# Patient Record
Sex: MALE | Race: WHITE | Employment: OTHER | URBAN - METROPOLITAN AREA
[De-identification: names, ages, dates, MRNs, and addresses within clinical notes are randomized per-mention and may not be internally consistent; named-entity substitution may affect disease eponyms.]

---

## 2017-05-01 PROCEDURE — 84153 ASSAY OF PSA TOTAL: CPT | Performed by: INTERNAL MEDICINE

## 2017-05-17 PROBLEM — Z79.899 ENCOUNTER FOR LONG-TERM (CURRENT) DRUG USE: Status: ACTIVE | Noted: 2017-05-17

## 2017-05-17 PROBLEM — Z12.11 COLON CANCER SCREENING: Status: ACTIVE | Noted: 2017-05-17

## 2017-12-12 PROBLEM — M79.642 LEFT HAND PAIN: Status: ACTIVE | Noted: 2017-12-12

## 2017-12-12 PROBLEM — M79.641 RIGHT HAND PAIN: Status: ACTIVE | Noted: 2017-12-12

## 2018-02-08 PROBLEM — M79.641 RIGHT HAND PAIN: Status: RESOLVED | Noted: 2017-12-12 | Resolved: 2018-02-08

## 2018-02-08 PROBLEM — M79.642 LEFT HAND PAIN: Status: RESOLVED | Noted: 2017-12-12 | Resolved: 2018-02-08

## 2018-05-16 PROCEDURE — 84153 ASSAY OF PSA TOTAL: CPT | Performed by: INTERNAL MEDICINE

## 2018-05-30 PROBLEM — I77.9 CAROTID DISEASE, BILATERAL: Status: ACTIVE | Noted: 2018-05-30

## 2018-05-30 PROBLEM — I77.9 CAROTID DISEASE, BILATERAL (HCC): Status: ACTIVE | Noted: 2018-05-30

## 2018-11-15 PROCEDURE — 84153 ASSAY OF PSA TOTAL: CPT | Performed by: INTERNAL MEDICINE

## 2020-06-29 PROBLEM — Z12.11 COLON CANCER SCREENING: Status: RESOLVED | Noted: 2017-05-17 | Resolved: 2020-06-29

## 2020-06-29 PROBLEM — Z79.899 ENCOUNTER FOR LONG-TERM (CURRENT) DRUG USE: Status: RESOLVED | Noted: 2017-05-17 | Resolved: 2020-06-29

## 2021-01-11 ENCOUNTER — LAB ENCOUNTER (OUTPATIENT)
Dept: LAB | Facility: HOSPITAL | Age: 81
End: 2021-01-11
Attending: INTERNAL MEDICINE
Payer: MEDICARE

## 2021-01-11 DIAGNOSIS — R14.2 BELCHING: ICD-10-CM

## 2021-01-12 LAB — SARS-COV-2 RNA RESP QL NAA+PROBE: NOT DETECTED

## 2021-01-13 ENCOUNTER — ANESTHESIA EVENT (OUTPATIENT)
Dept: ENDOSCOPY | Facility: HOSPITAL | Age: 81
End: 2021-01-13
Payer: MEDICARE

## 2021-01-14 ENCOUNTER — ANESTHESIA (OUTPATIENT)
Dept: ENDOSCOPY | Facility: HOSPITAL | Age: 81
End: 2021-01-14
Payer: MEDICARE

## 2021-01-14 ENCOUNTER — HOSPITAL ENCOUNTER (OUTPATIENT)
Facility: HOSPITAL | Age: 81
Setting detail: HOSPITAL OUTPATIENT SURGERY
Discharge: HOME | End: 2021-01-14
Attending: INTERNAL MEDICINE | Admitting: INTERNAL MEDICINE
Payer: MEDICARE

## 2021-01-14 VITALS
OXYGEN SATURATION: 100 % | BODY MASS INDEX: 28.7 KG/M2 | DIASTOLIC BLOOD PRESSURE: 54 MMHG | RESPIRATION RATE: 20 BRPM | WEIGHT: 205 LBS | HEART RATE: 70 BPM | HEIGHT: 71 IN | TEMPERATURE: 97 F | SYSTOLIC BLOOD PRESSURE: 92 MMHG

## 2021-01-14 DIAGNOSIS — R14.2 BELCHING: Primary | ICD-10-CM

## 2021-01-14 DIAGNOSIS — R10.12 LEFT UPPER QUADRANT ABDOMINAL PAIN: ICD-10-CM

## 2021-01-14 PROCEDURE — 0DB68ZX EXCISION OF STOMACH, VIA NATURAL OR ARTIFICIAL OPENING ENDOSCOPIC, DIAGNOSTIC: ICD-10-PCS | Performed by: INTERNAL MEDICINE

## 2021-01-14 PROCEDURE — 0DB98ZX EXCISION OF DUODENUM, VIA NATURAL OR ARTIFICIAL OPENING ENDOSCOPIC, DIAGNOSTIC: ICD-10-PCS | Performed by: INTERNAL MEDICINE

## 2021-01-14 PROCEDURE — 0DBP8ZX EXCISION OF RECTUM, VIA NATURAL OR ARTIFICIAL OPENING ENDOSCOPIC, DIAGNOSTIC: ICD-10-PCS | Performed by: INTERNAL MEDICINE

## 2021-01-14 PROCEDURE — 88305 TISSUE EXAM BY PATHOLOGIST: CPT | Performed by: INTERNAL MEDICINE

## 2021-01-14 PROCEDURE — 0DBM8ZX EXCISION OF DESCENDING COLON, VIA NATURAL OR ARTIFICIAL OPENING ENDOSCOPIC, DIAGNOSTIC: ICD-10-PCS | Performed by: INTERNAL MEDICINE

## 2021-01-14 RX ORDER — SODIUM CHLORIDE, SODIUM LACTATE, POTASSIUM CHLORIDE, CALCIUM CHLORIDE 600; 310; 30; 20 MG/100ML; MG/100ML; MG/100ML; MG/100ML
INJECTION, SOLUTION INTRAVENOUS CONTINUOUS
Status: DISCONTINUED | OUTPATIENT
Start: 2021-01-14 | End: 2021-01-14

## 2021-01-14 NOTE — DISCHARGE SUMMARY
[facility] ENDOSCOPY  Patients Name: Aquilino Acuña  Attending Physician: Boston Barr MD  Operating Physician: Wilberto Warren MD  CSN: [id number]     Location:  OR  MRN: [id number]    YOB: 1940  Admission Date: 1/14/2021  Operation Date: 1/14/2021

## 2021-01-14 NOTE — ANESTHESIA PREPROCEDURE EVALUATION
PRE-OP EVALUATION    Patient Name: Leida Sauceda    Pre-op Diagnosis: Left upper quadrant abdominal pain [R10.12]  Belching [R14.2]    Procedure(s):  COLONOSCOPY AND  ESOPHAGOGASTRODUODENOSCOPY      Surgeon(s) and Role:     * Luca Churchill MD - Primary ROS.                       Neuro/Psych    Negative neuro/psych ROS.                                 Past Surgical History:   Procedure Laterality Date   • EXCISION OF SEBACEOUS CYST N/A 10/16/2014    Performed by Joya Blakely MD at [address] [place] [address] general anesthesia if unable to tolerate procedure in MAC    Plan/risks discussed with: patient                Present on Admission:  **None**

## 2021-01-14 NOTE — ANESTHESIA POSTPROCEDURE EVALUATION
[address] Patient Status:  Hospital Outpatient Surgery   Age/Gender 78year old male MRN [id number]   [facility] ENDOSCOPY Attending Gerry Ansari MD   Hosp Day # 0 PCP Temitope Magana MD       Anesthesia Post-op Note

## 2021-01-14 NOTE — OPERATIVE REPORT
[facility] OPERATIVE REPORT   PATIENT NAME: Lian Akins  MRN: [id number]  DATE OF OPERATION: 1/14/2021  PREOPERATIVE DIAGNOSIS:   1. Black stools  2. L sided pain  3. Change in bowel habits   POSTOPERATIVE DIAGNOSES:  1.  Normal d which revealed no obvious perianal disease or palpable masses within the anal canal. The lubricated tip of an Olympus video colonoscope was introduced into the anus and advanced through the colon to the cecum.  The cecum was identified by the ileocecal valv

## 2021-01-14 NOTE — H&P
History & Physical Examination    Name: Teresa Castle  Patient ID:  [id number]  Date:  1/14/2021  YOB: 1940 AGE:  78year old SEX:  male      M.D./D.O./D.P.M PERFORMING SURGERY/PROCEDURE:    Farhana Estrada MD    Diagnosis:  (R14.2) Radha trunk,arm,leg (N/A, 10/16/2014); patient withough preoperative order for iv antibiotic surgical site infection prophylaxis.  (N/A, 10/16/2014); patient documented not to have experienced any of the following events (N/A, 10/16/2014); total knee replacement [facility]   • HIP REPLACEMENT SURGERY         right hip -2004 - dr Rodrick Ko   • OTHER SURGICAL HISTORY         excision of michelle scys on back- Dr. Jp Modi   • SPECIAL SERVICE OR REPORT   Aug 2006     Brachytherapy of the prostate   • TONSILLECTOMY         anxiety  HEMATOLOGIC: denies bleeding or bruising  ENDOCRINE: denies inc in thirst or heat/cold intolerance  ALL/ASTHMA: denies hx of allergy or asthma        PHYSICAL EXAM:   /82   Ht 5' 11\" (1.803 m)   Wt 212 lb (96.2 kg)   BMI 29.57 kg/m²    GENE fL 10.5      Radiological data:  CT ABDOMEN+PELVIS(CPT=74176)  CLINICAL INDICATION: Left lower quadrant pain. COMPARISON STUDY: 6/27/2008.   TECHNIQUE:  Multiple axial images of the abdomen and pelvis were performed from the lung bases  through the pubic s seeds are demonstrated within the prostate gland. MESENTERY/RETROPERITONEUM: There is no evidence of mesenteric, retroperitoneal or inguinal  adenopathy. PERITONEUM: There is no free air or significant free fluid.   OSSEOUS STRUCTURES: There are bilateral

## 2021-02-08 NOTE — PROGRESS NOTES
2/8/2021  [facility] [address] [id number]    Dear Rito Storeyor,      Here are the biopsy/pathology findings from your recent EGD (upper endoscopy) and colonoscopy:     The biopsy/pathology findings from your upper endoscopy showed:    gastrit

## 2021-04-09 ENCOUNTER — OFFICE VISIT (OUTPATIENT)
Dept: PODIATRY CLINIC | Facility: CLINIC | Age: 81
End: 2021-04-09
Payer: MEDICARE

## 2021-04-09 DIAGNOSIS — L84 PRE-ULCERATIVE CALLUSES: Primary | ICD-10-CM

## 2021-04-09 DIAGNOSIS — M21.622 TAILOR'S BUNION OF BOTH FEET: ICD-10-CM

## 2021-04-09 DIAGNOSIS — M20.12 VALGUS DEFORMITY OF BOTH GREAT TOES: ICD-10-CM

## 2021-04-09 DIAGNOSIS — M77.41 METATARSALGIA OF BOTH FEET: ICD-10-CM

## 2021-04-09 DIAGNOSIS — M21.621 TAILOR'S BUNION OF BOTH FEET: ICD-10-CM

## 2021-04-09 DIAGNOSIS — M20.11 VALGUS DEFORMITY OF BOTH GREAT TOES: ICD-10-CM

## 2021-04-09 DIAGNOSIS — M79.672 PAIN IN BOTH FEET: ICD-10-CM

## 2021-04-09 DIAGNOSIS — M21.6X9 PLANTAR FLEXED METATARSAL, UNSPECIFIED LATERALITY: ICD-10-CM

## 2021-04-09 DIAGNOSIS — M79.671 PAIN IN BOTH FEET: ICD-10-CM

## 2021-04-09 DIAGNOSIS — M77.42 METATARSALGIA OF BOTH FEET: ICD-10-CM

## 2021-04-09 PROCEDURE — 99213 OFFICE O/P EST LOW 20 MIN: CPT | Performed by: PODIATRIST

## 2021-04-09 NOTE — PROGRESS NOTES
Enio Ortiz is a 78year old male. Patient presents with:  New Patient: painful calluses on the bottom of bilateral feet - pain scale 8/10.       HPI:     This 78-year-old male patient who is a previous patient of mine from our previous practice presents to 10/16/2014    Procedure: EXCISION OF SEBACEOUS CYST;  Surgeon: Yesenia Moyer MD;  Location: [facility]   • HIP REPLACEMENT SURGERY      right hip -2004 - dr Dirk Leos   • OTHER SURGICAL HISTORY      excision of seb scys on back- Dr. Bhakti Junior   • distress  EXTREMITIES:   1. Integument: Normal skin temperature and turgor. preulcerative callus buildup is noted at the plantar aspect of his fifth MPJs bilateral and his left first MPJ with nucleation bilateral with tenderness on palpation to the lesions. and which are wide enough and deep enough in order to properly accommodate both of his feet along with his custom orthotics without causing any irritation/pressure/pain/other issues anywhere.   He is to take his custom orthotics with him to be appropriately

## 2021-05-14 ENCOUNTER — OFFICE VISIT (OUTPATIENT)
Dept: PODIATRY CLINIC | Facility: CLINIC | Age: 81
End: 2021-05-14
Payer: MEDICARE

## 2021-05-14 DIAGNOSIS — M21.621 TAILOR'S BUNION OF BOTH FEET: ICD-10-CM

## 2021-05-14 DIAGNOSIS — L84 PRE-ULCERATIVE CALLUSES: ICD-10-CM

## 2021-05-14 DIAGNOSIS — M21.6X9 PLANTAR FLEXED METATARSAL, UNSPECIFIED LATERALITY: ICD-10-CM

## 2021-05-14 DIAGNOSIS — M20.12 VALGUS DEFORMITY OF BOTH GREAT TOES: ICD-10-CM

## 2021-05-14 DIAGNOSIS — M77.41 METATARSALGIA OF BOTH FEET: Primary | ICD-10-CM

## 2021-05-14 DIAGNOSIS — M79.671 PAIN IN BOTH FEET: ICD-10-CM

## 2021-05-14 DIAGNOSIS — M79.672 PAIN IN BOTH FEET: ICD-10-CM

## 2021-05-14 DIAGNOSIS — M20.11 VALGUS DEFORMITY OF BOTH GREAT TOES: ICD-10-CM

## 2021-05-14 DIAGNOSIS — M77.42 METATARSALGIA OF BOTH FEET: Primary | ICD-10-CM

## 2021-05-14 DIAGNOSIS — M21.622 TAILOR'S BUNION OF BOTH FEET: ICD-10-CM

## 2021-05-14 PROCEDURE — 99213 OFFICE O/P EST LOW 20 MIN: CPT | Performed by: PODIATRIST

## 2021-05-16 NOTE — PROGRESS NOTES
Angela Ferrer is a 78year old male. Patient presents with: Follow-up: painful calluses – better    HPI:     This 43-year-old male patient returns to clinic today for follow up of preulcerative hyperkeratotic lesions on the bottom of both of his feet.   He r Location: [facility]   • HIP REPLACEMENT SURGERY      right hip -2004 - dr Dagoberto Bird   • OTHER SURGICAL HISTORY      excision of michelle scys on back- Dr. Fu Age   • PATIENT DOCUMENTED NOT TO HAVE EXPERIENCED ANY OF THE FOLLOWING EVENTS N/A 10/16/201 hyperkeratotic buildup is noted at the plantar aspect of his fifth MPJs bilateral and mildly on his left first MPJ with nucleation on his fifth MPJs bilateral which are all improved with decreased callus buildup and with less tenderness on palpation to the wide enough and deep enough in order to appropriately accommodate both of his feet along with his custom orthotics without causing any irritation/pressure/pain/other issues anywhere on his feet.   He will monitor both of his feet and will inform the office

## 2021-07-12 PROBLEM — M54.50 LEFT-SIDED LOW BACK PAIN WITHOUT SCIATICA, UNSPECIFIED CHRONICITY: Status: ACTIVE | Noted: 2021-07-12

## 2021-07-14 ENCOUNTER — OFFICE VISIT (OUTPATIENT)
Dept: PODIATRY CLINIC | Facility: CLINIC | Age: 81
End: 2021-07-14
Payer: MEDICARE

## 2021-07-14 DIAGNOSIS — M77.41 METATARSALGIA OF BOTH FEET: ICD-10-CM

## 2021-07-14 DIAGNOSIS — B35.1 ONYCHOMYCOSIS: ICD-10-CM

## 2021-07-14 DIAGNOSIS — M21.622 TAILOR'S BUNION OF BOTH FEET: ICD-10-CM

## 2021-07-14 DIAGNOSIS — M21.621 TAILOR'S BUNION OF BOTH FEET: ICD-10-CM

## 2021-07-14 DIAGNOSIS — M79.674 TOE PAIN, BILATERAL: ICD-10-CM

## 2021-07-14 DIAGNOSIS — M20.12 VALGUS DEFORMITY OF BOTH GREAT TOES: Primary | ICD-10-CM

## 2021-07-14 DIAGNOSIS — M20.11 VALGUS DEFORMITY OF BOTH GREAT TOES: Primary | ICD-10-CM

## 2021-07-14 DIAGNOSIS — L60.0 ONYCHOCRYPTOSIS: ICD-10-CM

## 2021-07-14 DIAGNOSIS — M77.42 METATARSALGIA OF BOTH FEET: ICD-10-CM

## 2021-07-14 DIAGNOSIS — M79.675 TOE PAIN, BILATERAL: ICD-10-CM

## 2021-07-14 DIAGNOSIS — M21.6X9 PLANTAR FLEXED METATARSAL, UNSPECIFIED LATERALITY: ICD-10-CM

## 2021-07-14 DIAGNOSIS — L84 PRE-ULCERATIVE CALLUSES: ICD-10-CM

## 2021-07-14 PROCEDURE — 99213 OFFICE O/P EST LOW 20 MIN: CPT | Performed by: PODIATRIST

## 2021-07-31 NOTE — PROGRESS NOTES
Leida Sauceda is a 78year old male. Patient presents with:   Follow-up: Painful calluses, better - Long nails unable to trim on his own    HPI:     This 78-year-old male patient returns to clinic today for follow up of preulcerative hyperkeratotic lesions on Other and unspecified personal history of malignant neoplasm     Prostate cancer. [place] 3,3.   Pretreatment psa 4.4   • Reflux    • S/P [ ~ ' 73 ] vasectomy  1/16/2013      Past Surgical History:   Procedure Laterality Date   • COLONOSCOPY N/A 1/14/2021 otherwise  SKIN: denies any unusual skin lesions or rashes  RESPIRATORY: denies shortness of breath with exertion  CARDIOVASCULAR: denies chest pain on exertion  GI: denies abdominal pain and denies heartburn  NEURO: denies headaches    EXAM:   There were bilateral    Onychocryptosis    Onychomycosis        Plan: Discussed the clinical findings with the patient along with the treatment options.   Debrided all of his preulcerative callus buildup bilateral in the appropriate fashion without incident bilateral

## 2021-08-18 ENCOUNTER — HOSPITAL ENCOUNTER (OUTPATIENT)
Facility: HOSPITAL | Age: 81
Setting detail: OBSERVATION
Discharge: HOME | End: 2021-08-19
Attending: EMERGENCY MEDICINE | Admitting: INTERNAL MEDICINE
Payer: MEDICARE

## 2021-08-18 ENCOUNTER — APPOINTMENT (OUTPATIENT)
Dept: CT IMAGING | Facility: HOSPITAL | Age: 81
End: 2021-08-18
Attending: EMERGENCY MEDICINE
Payer: MEDICARE

## 2021-08-18 DIAGNOSIS — R55 SYNCOPE AND COLLAPSE: Primary | ICD-10-CM

## 2021-08-18 LAB
ALBUMIN SERPL-MCNC: 3.7 G/DL (ref 3.4–5)
ALBUMIN/GLOB SERPL: 1.1 {RATIO} (ref 1–2)
ALP LIVER SERPL-CCNC: 68 U/L
ALT SERPL-CCNC: 24 U/L
ANION GAP SERPL CALC-SCNC: 2 MMOL/L (ref 0–18)
AST SERPL-CCNC: 19 U/L (ref 15–37)
BASOPHILS # BLD AUTO: 0.05 X10(3) UL (ref 0–0.2)
BASOPHILS NFR BLD AUTO: 0.6 %
BILIRUB SERPL-MCNC: 1 MG/DL (ref 0.1–2)
BILIRUB UR QL STRIP.AUTO: NEGATIVE
BUN BLD-MCNC: 13 MG/DL (ref 7–18)
CALCIUM BLD-MCNC: 8.7 MG/DL (ref 8.5–10.1)
CHLORIDE SERPL-SCNC: 105 MMOL/L (ref 98–112)
CLARITY UR REFRACT.AUTO: CLEAR
CO2 SERPL-SCNC: 30 MMOL/L (ref 21–32)
COLOR UR AUTO: YELLOW
CREAT BLD-MCNC: 0.96 MG/DL
EOSINOPHIL # BLD AUTO: 0.11 X10(3) UL (ref 0–0.7)
EOSINOPHIL NFR BLD AUTO: 1.3 %
ERYTHROCYTE [DISTWIDTH] IN BLOOD BY AUTOMATED COUNT: 12.6 %
GLOBULIN PLAS-MCNC: 3.4 G/DL (ref 2.8–4.4)
GLUCOSE BLD-MCNC: 111 MG/DL (ref 70–99)
GLUCOSE UR STRIP.AUTO-MCNC: NEGATIVE MG/DL
HAV IGM SER QL: 2.1 MG/DL (ref 1.6–2.6)
HCT VFR BLD AUTO: 41.6 %
HGB BLD-MCNC: 14 G/DL
IMM GRANULOCYTES # BLD AUTO: 0.03 X10(3) UL (ref 0–1)
IMM GRANULOCYTES NFR BLD: 0.3 %
KETONES UR STRIP.AUTO-MCNC: NEGATIVE MG/DL
LEUKOCYTE ESTERASE UR QL STRIP.AUTO: NEGATIVE
LIPASE SERPL-CCNC: 153 U/L (ref 73–393)
LYMPHOCYTES # BLD AUTO: 2.29 X10(3) UL (ref 1–4)
LYMPHOCYTES NFR BLD AUTO: 26 %
M PROTEIN MFR SERPL ELPH: 7.1 G/DL (ref 6.4–8.2)
MCH RBC QN AUTO: 33.9 PG (ref 26–34)
MCHC RBC AUTO-ENTMCNC: 33.7 G/DL (ref 31–37)
MCV RBC AUTO: 100.7 FL
MONOCYTES # BLD AUTO: 0.82 X10(3) UL (ref 0.1–1)
MONOCYTES NFR BLD AUTO: 9.3 %
NEUTROPHILS # BLD AUTO: 5.5 X10 (3) UL (ref 1.5–7.7)
NEUTROPHILS # BLD AUTO: 5.5 X10(3) UL (ref 1.5–7.7)
NEUTROPHILS NFR BLD AUTO: 62.5 %
NITRITE UR QL STRIP.AUTO: NEGATIVE
OSMOLALITY SERPL CALC.SUM OF ELEC: 285 MOSM/KG (ref 275–295)
PH UR STRIP.AUTO: 6 [PH] (ref 5–8)
PLATELET # BLD AUTO: 213 10(3)UL (ref 150–450)
POTASSIUM SERPL-SCNC: 3.9 MMOL/L (ref 3.5–5.1)
PROT UR STRIP.AUTO-MCNC: NEGATIVE MG/DL
RBC # BLD AUTO: 4.13 X10(6)UL
SARS-COV-2 RNA RESP QL NAA+PROBE: NOT DETECTED
SODIUM SERPL-SCNC: 137 MMOL/L (ref 136–145)
SP GR UR STRIP.AUTO: 1.01 (ref 1–1.03)
TROPONIN I SERPL-MCNC: <0.045 NG/ML (ref ?–0.04)
UROBILINOGEN UR STRIP.AUTO-MCNC: <2 MG/DL
WBC # BLD AUTO: 8.8 X10(3) UL (ref 4–11)

## 2021-08-18 PROCEDURE — 85025 COMPLETE CBC W/AUTO DIFF WBC: CPT | Performed by: EMERGENCY MEDICINE

## 2021-08-18 PROCEDURE — 99285 EMERGENCY DEPT VISIT HI MDM: CPT

## 2021-08-18 PROCEDURE — 83735 ASSAY OF MAGNESIUM: CPT | Performed by: EMERGENCY MEDICINE

## 2021-08-18 PROCEDURE — 70450 CT HEAD/BRAIN W/O DYE: CPT | Performed by: EMERGENCY MEDICINE

## 2021-08-18 PROCEDURE — 84484 ASSAY OF TROPONIN QUANT: CPT | Performed by: EMERGENCY MEDICINE

## 2021-08-18 PROCEDURE — 93005 ELECTROCARDIOGRAM TRACING: CPT

## 2021-08-18 PROCEDURE — 83690 ASSAY OF LIPASE: CPT | Performed by: EMERGENCY MEDICINE

## 2021-08-18 PROCEDURE — 93010 ELECTROCARDIOGRAM REPORT: CPT

## 2021-08-18 PROCEDURE — 81001 URINALYSIS AUTO W/SCOPE: CPT | Performed by: EMERGENCY MEDICINE

## 2021-08-18 PROCEDURE — 36415 COLL VENOUS BLD VENIPUNCTURE: CPT

## 2021-08-18 PROCEDURE — 80053 COMPREHEN METABOLIC PANEL: CPT | Performed by: EMERGENCY MEDICINE

## 2021-08-18 RX ORDER — ERYTHROMYCIN 5 MG/G
OINTMENT OPHTHALMIC
COMMUNITY
Start: 2021-08-05 | End: 2021-10-05 | Stop reason: ALTCHOICE

## 2021-08-18 RX ORDER — MOXIFLOXACIN 5 MG/ML
SOLUTION/ DROPS OPHTHALMIC
COMMUNITY
Start: 2021-08-15 | End: 2021-10-05 | Stop reason: ALTCHOICE

## 2021-08-18 NOTE — ED INITIAL ASSESSMENT (HPI)
Patient with c/o left flank pain along with belching and emesis x3 last night. Patient states by morning pain had resolved, went to the bank and his legs gave out and he fell, no LOC. Patient states he did hit his head, not on blood thinners.

## 2021-08-19 ENCOUNTER — APPOINTMENT (OUTPATIENT)
Dept: CV DIAGNOSTICS | Facility: HOSPITAL | Age: 81
End: 2021-08-19
Attending: INTERNAL MEDICINE
Payer: MEDICARE

## 2021-08-19 VITALS
SYSTOLIC BLOOD PRESSURE: 163 MMHG | BODY MASS INDEX: 27.75 KG/M2 | OXYGEN SATURATION: 96 % | WEIGHT: 198.19 LBS | HEIGHT: 71 IN | TEMPERATURE: 98 F | RESPIRATION RATE: 18 BRPM | DIASTOLIC BLOOD PRESSURE: 78 MMHG | HEART RATE: 76 BPM

## 2021-08-19 PROBLEM — R55 SYNCOPE AND COLLAPSE: Status: ACTIVE | Noted: 2021-08-19

## 2021-08-19 LAB
ANION GAP SERPL CALC-SCNC: 6 MMOL/L (ref 0–18)
ATRIAL RATE: 73 BPM
BASOPHILS # BLD AUTO: 0.05 X10(3) UL (ref 0–0.2)
BASOPHILS NFR BLD AUTO: 0.5 %
BUN BLD-MCNC: 13 MG/DL (ref 7–18)
CALCIUM BLD-MCNC: 8.9 MG/DL (ref 8.5–10.1)
CHLORIDE SERPL-SCNC: 105 MMOL/L (ref 98–112)
CO2 SERPL-SCNC: 30 MMOL/L (ref 21–32)
CREAT BLD-MCNC: 0.86 MG/DL
EOSINOPHIL # BLD AUTO: 0.14 X10(3) UL (ref 0–0.7)
EOSINOPHIL NFR BLD AUTO: 1.5 %
ERYTHROCYTE [DISTWIDTH] IN BLOOD BY AUTOMATED COUNT: 12.6 %
GLUCOSE BLD-MCNC: 100 MG/DL (ref 70–99)
HCT VFR BLD AUTO: 43.5 %
HGB BLD-MCNC: 14.7 G/DL
IMM GRANULOCYTES # BLD AUTO: 0.03 X10(3) UL (ref 0–1)
IMM GRANULOCYTES NFR BLD: 0.3 %
LYMPHOCYTES # BLD AUTO: 2.33 X10(3) UL (ref 1–4)
LYMPHOCYTES NFR BLD AUTO: 25.2 %
MCH RBC QN AUTO: 33.9 PG (ref 26–34)
MCHC RBC AUTO-ENTMCNC: 33.8 G/DL (ref 31–37)
MCV RBC AUTO: 100.2 FL
MONOCYTES # BLD AUTO: 0.83 X10(3) UL (ref 0.1–1)
MONOCYTES NFR BLD AUTO: 9 %
NEUTROPHILS # BLD AUTO: 5.86 X10 (3) UL (ref 1.5–7.7)
NEUTROPHILS # BLD AUTO: 5.86 X10(3) UL (ref 1.5–7.7)
NEUTROPHILS NFR BLD AUTO: 63.5 %
OSMOLALITY SERPL CALC.SUM OF ELEC: 292 MOSM/KG (ref 275–295)
P AXIS: 92 DEGREES
P-R INTERVAL: 148 MS
PLATELET # BLD AUTO: 224 10(3)UL (ref 150–450)
POTASSIUM SERPL-SCNC: 3.5 MMOL/L (ref 3.5–5.1)
POTASSIUM SERPL-SCNC: 4.1 MMOL/L (ref 3.5–5.1)
Q-T INTERVAL: 398 MS
QRS DURATION: 78 MS
QTC CALCULATION (BEZET): 438 MS
R AXIS: 31 DEGREES
RBC # BLD AUTO: 4.34 X10(6)UL
SODIUM SERPL-SCNC: 141 MMOL/L (ref 136–145)
T AXIS: 63 DEGREES
TROPONIN I SERPL-MCNC: <0.045 NG/ML (ref ?–0.04)
VENTRICULAR RATE: 73 BPM
WBC # BLD AUTO: 9.2 X10(3) UL (ref 4–11)

## 2021-08-19 PROCEDURE — 84484 ASSAY OF TROPONIN QUANT: CPT | Performed by: INTERNAL MEDICINE

## 2021-08-19 PROCEDURE — 84132 ASSAY OF SERUM POTASSIUM: CPT | Performed by: HOSPITALIST

## 2021-08-19 PROCEDURE — 80048 BASIC METABOLIC PNL TOTAL CA: CPT | Performed by: INTERNAL MEDICINE

## 2021-08-19 PROCEDURE — 97161 PT EVAL LOW COMPLEX 20 MIN: CPT

## 2021-08-19 PROCEDURE — 96374 THER/PROPH/DIAG INJ IV PUSH: CPT

## 2021-08-19 PROCEDURE — 96372 THER/PROPH/DIAG INJ SC/IM: CPT

## 2021-08-19 PROCEDURE — 93306 TTE W/DOPPLER COMPLETE: CPT | Performed by: INTERNAL MEDICINE

## 2021-08-19 PROCEDURE — 97116 GAIT TRAINING THERAPY: CPT

## 2021-08-19 PROCEDURE — 85025 COMPLETE CBC W/AUTO DIFF WBC: CPT | Performed by: INTERNAL MEDICINE

## 2021-08-19 RX ORDER — PANTOPRAZOLE SODIUM 40 MG/1
40 TABLET, DELAYED RELEASE ORAL
Status: DISCONTINUED | OUTPATIENT
Start: 2021-08-19 | End: 2021-08-19

## 2021-08-19 RX ORDER — BISACODYL 10 MG
10 SUPPOSITORY, RECTAL RECTAL
Status: DISCONTINUED | OUTPATIENT
Start: 2021-08-19 | End: 2021-08-19

## 2021-08-19 RX ORDER — ATORVASTATIN CALCIUM 80 MG/1
80 TABLET, FILM COATED ORAL EVERY EVENING
Status: DISCONTINUED | OUTPATIENT
Start: 2021-08-19 | End: 2021-08-19

## 2021-08-19 RX ORDER — POTASSIUM CHLORIDE 20 MEQ/1
40 TABLET, EXTENDED RELEASE ORAL EVERY 4 HOURS
Status: COMPLETED | OUTPATIENT
Start: 2021-08-19 | End: 2021-08-19

## 2021-08-19 RX ORDER — ACETAMINOPHEN 325 MG/1
650 TABLET ORAL EVERY 6 HOURS PRN
Status: DISCONTINUED | OUTPATIENT
Start: 2021-08-19 | End: 2021-08-19

## 2021-08-19 RX ORDER — METOCLOPRAMIDE HYDROCHLORIDE 5 MG/ML
10 INJECTION INTRAMUSCULAR; INTRAVENOUS EVERY 8 HOURS PRN
Status: DISCONTINUED | OUTPATIENT
Start: 2021-08-19 | End: 2021-08-19

## 2021-08-19 RX ORDER — POLYETHYLENE GLYCOL 3350 17 G/17G
17 POWDER, FOR SOLUTION ORAL DAILY PRN
Status: DISCONTINUED | OUTPATIENT
Start: 2021-08-19 | End: 2021-08-19

## 2021-08-19 RX ORDER — HEPARIN SODIUM 5000 [USP'U]/ML
5000 INJECTION, SOLUTION INTRAVENOUS; SUBCUTANEOUS EVERY 8 HOURS SCHEDULED
Status: DISCONTINUED | OUTPATIENT
Start: 2021-08-19 | End: 2021-08-19

## 2021-08-19 RX ORDER — ONDANSETRON 2 MG/ML
4 INJECTION INTRAMUSCULAR; INTRAVENOUS EVERY 6 HOURS PRN
Status: DISCONTINUED | OUTPATIENT
Start: 2021-08-19 | End: 2021-08-19

## 2021-08-19 RX ORDER — HYDRALAZINE HYDROCHLORIDE 20 MG/ML
5 INJECTION INTRAMUSCULAR; INTRAVENOUS EVERY 6 HOURS PRN
Status: DISCONTINUED | OUTPATIENT
Start: 2021-08-19 | End: 2021-08-19

## 2021-08-19 RX ORDER — ATORVASTATIN CALCIUM 80 MG/1
80 TABLET, FILM COATED ORAL EVERY EVENING
Status: DISCONTINUED | OUTPATIENT
Start: 2021-08-20 | End: 2021-08-19

## 2021-08-19 RX ORDER — SODIUM PHOSPHATE, DIBASIC AND SODIUM PHOSPHATE, MONOBASIC 7; 19 G/133ML; G/133ML
1 ENEMA RECTAL ONCE AS NEEDED
Status: DISCONTINUED | OUTPATIENT
Start: 2021-08-19 | End: 2021-08-19

## 2021-08-19 RX ORDER — LISINOPRIL 40 MG/1
40 TABLET ORAL DAILY
Status: DISCONTINUED | OUTPATIENT
Start: 2021-08-19 | End: 2021-08-19

## 2021-08-19 RX ORDER — LISINOPRIL 40 MG/1
40 TABLET ORAL DAILY
Status: DISCONTINUED | OUTPATIENT
Start: 2021-08-20 | End: 2021-08-19

## 2021-08-19 NOTE — PROGRESS NOTES
Echo results paged to cardiology. Cardiology will s/o  Discharge orders received. NURSING DISCHARGE NOTE    Discharged Home via Ambulatory. Accompanied by Family member  Belongings Taken by patient/family. IV and tele removed.     Patient and

## 2021-08-19 NOTE — PLAN OF CARE
A/O x 4, vitals stable, on room air, no SOB, NSR on tele, denies having pain.    K replaced per cardiac electrolyte protocol  PT eval pending  Cardiology consulted, ECHO is pending  Patient denies syncopal episodes, no n/v, ambulates with stand by assistanc behaviors that affect risk of falls.   - [place] fall precautions as indicated by assessment.  - Educate pt/family on patient safety including physical limitations  - Instruct pt to call for assistance with activity based on assessment  - Modify environme

## 2021-08-19 NOTE — ED PROVIDER NOTES
Patient Seen in: [facility] Emergency Department      History   Patient presents with:  Abdomen/Flank Pain  Fall    Stated Complaint: fall today, hit back of head, denies LOC.  states last night having abd/flank pa*    HPI/Subjective:   HPI    Patien Date   • COLONOSCOPY N/A 1/14/2021    Procedure: COLONOSCOPY;  Surgeon: Denilson Short MD;  Location: [address] [place] [address] ENDOSCOPY   • [address] 1.1-2CM TRUNK,ARM,LEG N/A 10/16/2014    Procedure: EXCISION OF SEBACEOUS CYST;  Surgeon: Shabnam Bethea MD;  Location: distress. Appearance: He is well-developed. He is not toxic-appearing. HENT:      Head: Normocephalic and atraumatic. Eyes:      General: No scleral icterus.      Conjunctiva/sclera: Conjunctivae normal.   Cardiovascular:      Rate and Rhythm: Tommy Ramos Abnormal            Final result                 Please view results for these tests on the individual orders. EKG    Rate, intervals and axes as noted on EKG Report.   Rate: 73  Rhythm: Sinus Rhythm  Reading: normal ekg                     CT B Patient had an IV established and labs were drawn.    -Labs were ordered and reviewed by myself. -Imaging studies were ordered and reviewed by myself.   -If available, previous medical record was reviewed for the patient to obtain additional history.  -Елена Dupree

## 2021-08-19 NOTE — H&P
[facility] Hospitalist H&P       CC: syncope    PCP: Deloris Velazquez MD    History of Present Illness: Pt is an 77 yo with mmp including but not limited to HTN/HL, GERD, is admitted for syncope. Says day prior, ate at restaurant, had 3 episodes n/v. No blood. TO HAVE EXPERIENCED ANY OF THE FOLLOWING EVENTS N/A 10/16/2014    Procedure: EXCISION OF SEBACEOUS CYST;  Surgeon: Luigi Stevens MD;  Location: [facility]   • PATIENT [facility] IV ANTIBIOTIC SURGICAL SITE INFECTION PROPHYLAXIS. Ht 5' 11\" (1.803 m)   Wt 198 lb 3.2 oz (89.9 kg)   SpO2 96%   BMI 27.64 kg/m²   General:  Alert, NAD, appears stated age   Head:  Normocephalic, without obvious abnormality, atraumatic. Eyes:  Sclera anicteric,  EOMs intact. Lids wnl.  PE   Ears, nose, vasovagal. BS upon admit unremarkable, Creatinine ok. UA neg.  Rapid covid neg  -rule out arrythmia, doubt ACS  -cards consult, appreciate  -tele    **incidental finding of blood in urine- f/u with PCP for repeat    **HTN/HL-home meds  **GERD- PPI    **PPx-

## 2021-08-19 NOTE — PHYSICAL THERAPY NOTE
PHYSICAL THERAPY QUICK EVALUATION - INPATIENT    Room Number: 3160/0069-G  Evaluation Date: 8/19/2021  Presenting Problem: syncope  Physician Order: PT Eval and Treat      Problem List  Principal Problem:    Syncope and collapse    Admitted from home aft Location: [facility]   • SPECIAL SERVICE OR REPORT  Aug 2006    Brachytherapy of the prostate   • TONSILLECTOMY     • TOTAL HIP REPLACEMENT Bilateral    • TOTAL KNEE REPLACEMENT Left        HOME SITUATION  Type of Home: House   Home Layout: Two level close to 1.5 minutes to return mid 90s with rest      Patient End of Session: In bed;Needs met;Call light within reach;RN aware of session/findings; All patient questions and concerns addressed; Family present    ASSESSMENT   Patient is a 78year old male ad

## 2021-08-19 NOTE — PROGRESS NOTES
NURSING ADMISSION NOTE      Patient admitted via Cart  Oriented to room. Safety precautions initiated. Bed in low position. Call light in reach.       Pt A&OX4 Room Air  Resting in bed  Meds given per Mar  Denies pain,N/V, SOB, Dizziness  Voids SB  Tro

## 2021-08-19 NOTE — CONSULTS
[facility] Cardiology  Consultation Note      Maryan Mitchell Patient Status:  Observation    1940 MRN [id number]   [facility] 3NE-A Attending Kaylan Davis, *   Hosp Day # 0 PCP Monica Robbins MD     Reason for consult: Q4H        Past Medical History:   Diagnosis Date   • Actinic keratosis - OSWALD Bloom 4/7/2014   • Cancer [facility])     prostate   • Colon cancer screening [6/2/13] recheck 10 years [At that time, is risk > benefit ?] -  JUANJOSE Miranda 5/17/2017   • Diverticulosis of He has never used smokeless tobacco. He reports current alcohol use. He reports that he does not use drugs. Allergies  No Known Allergies      Review of Systems:  As per HPI, otherwise 10 point ROS is negative in detail.       Physical Exam:  Blood pres diagnostics:    Tele: Predominantly sinus rhythm, no malignant arrhythmias     EKG 8/18/2021: NSR, normal EKG      Impression:  1. Syncope - orthostatics were negative. Suspect vasovagal.   2. HTN  3. HLD  4. Chronic low back pain    Recommendations:  1.  C

## 2021-08-26 PROBLEM — R55 SYNCOPE AND COLLAPSE: Status: RESOLVED | Noted: 2021-08-19 | Resolved: 2021-08-26

## 2021-09-15 ENCOUNTER — OFFICE VISIT (OUTPATIENT)
Dept: PODIATRY CLINIC | Facility: CLINIC | Age: 81
End: 2021-09-15
Payer: MEDICARE

## 2021-09-15 DIAGNOSIS — M20.12 VALGUS DEFORMITY OF BOTH GREAT TOES: ICD-10-CM

## 2021-09-15 DIAGNOSIS — M79.674 TOE PAIN, BILATERAL: ICD-10-CM

## 2021-09-15 DIAGNOSIS — M77.42 METATARSALGIA OF BOTH FEET: Primary | ICD-10-CM

## 2021-09-15 DIAGNOSIS — M21.622 TAILOR'S BUNION OF BOTH FEET: ICD-10-CM

## 2021-09-15 DIAGNOSIS — L84 PRE-ULCERATIVE CALLUSES: ICD-10-CM

## 2021-09-15 DIAGNOSIS — B35.1 ONYCHOMYCOSIS: ICD-10-CM

## 2021-09-15 DIAGNOSIS — M20.11 VALGUS DEFORMITY OF BOTH GREAT TOES: ICD-10-CM

## 2021-09-15 DIAGNOSIS — M21.621 TAILOR'S BUNION OF BOTH FEET: ICD-10-CM

## 2021-09-15 DIAGNOSIS — L60.0 ONYCHOCRYPTOSIS: ICD-10-CM

## 2021-09-15 DIAGNOSIS — M79.675 TOE PAIN, BILATERAL: ICD-10-CM

## 2021-09-15 DIAGNOSIS — M77.41 METATARSALGIA OF BOTH FEET: Primary | ICD-10-CM

## 2021-09-15 DIAGNOSIS — M21.6X9 PLANTAR FLEXED METATARSAL, UNSPECIFIED LATERALITY: ICD-10-CM

## 2021-09-15 PROCEDURE — 99213 OFFICE O/P EST LOW 20 MIN: CPT | Performed by: PODIATRIST

## 2021-10-10 NOTE — PROGRESS NOTES
Ely Kerr is a 78year old male. Patient presents with: Follow-up: Painful calluses, better - Long nails unable to trim on his own, painful on occasion    HPI:     This 68-year-old male patient returns to clinic today in follow up.   He has callused lesi unspecified personal history of malignant neoplasm     Prostate cancer. [place] 3,3.   Pretreatment psa 4.4   • Reflux    • S/P [ ~ ' 73 ] vasectomy  1/16/2013      Past Surgical History:   Procedure Laterality Date   • COLONOSCOPY N/A 1/14/2021    Procedu denies chest pain on exertion  GI: denies abdominal pain and denies heartburn  NEURO: denies headaches    EXAM:   There were no vitals taken for this visit.   Physical Exam   GENERAL: well developed, well nourished, in no apparent distress  EXTREMITIES:   1 without incident bilateral after all of which he expressed relief.   He is to continue to wear the accommodative donut pads in order to offload the lesions which he was reminded to remove daily for sleeping and showering and to discontinue use of with any a

## 2021-11-17 ENCOUNTER — OFFICE VISIT (OUTPATIENT)
Dept: PODIATRY CLINIC | Facility: CLINIC | Age: 81
End: 2021-11-17
Payer: MEDICARE

## 2021-11-17 DIAGNOSIS — M21.622 TAILOR'S BUNION OF BOTH FEET: ICD-10-CM

## 2021-11-17 DIAGNOSIS — M21.6X9 PLANTAR FLEXED METATARSAL, UNSPECIFIED LATERALITY: ICD-10-CM

## 2021-11-17 DIAGNOSIS — M20.12 VALGUS DEFORMITY OF BOTH GREAT TOES: ICD-10-CM

## 2021-11-17 DIAGNOSIS — M79.675 TOE PAIN, BILATERAL: Primary | ICD-10-CM

## 2021-11-17 DIAGNOSIS — L60.0 ONYCHOCRYPTOSIS: ICD-10-CM

## 2021-11-17 DIAGNOSIS — M77.41 METATARSALGIA OF BOTH FEET: ICD-10-CM

## 2021-11-17 DIAGNOSIS — L84 PRE-ULCERATIVE CALLUSES: ICD-10-CM

## 2021-11-17 DIAGNOSIS — M79.674 TOE PAIN, BILATERAL: Primary | ICD-10-CM

## 2021-11-17 DIAGNOSIS — B35.1 ONYCHOMYCOSIS: ICD-10-CM

## 2021-11-17 DIAGNOSIS — M21.621 TAILOR'S BUNION OF BOTH FEET: ICD-10-CM

## 2021-11-17 DIAGNOSIS — M20.11 VALGUS DEFORMITY OF BOTH GREAT TOES: ICD-10-CM

## 2021-11-17 DIAGNOSIS — M77.42 METATARSALGIA OF BOTH FEET: ICD-10-CM

## 2021-11-17 PROCEDURE — 99213 OFFICE O/P EST LOW 20 MIN: CPT | Performed by: PODIATRIST

## 2022-01-19 ENCOUNTER — OFFICE VISIT (OUTPATIENT)
Dept: PODIATRY CLINIC | Facility: CLINIC | Age: 82
End: 2022-01-19
Payer: MEDICARE

## 2022-01-19 DIAGNOSIS — B35.1 ONYCHOMYCOSIS: Primary | ICD-10-CM

## 2022-01-19 DIAGNOSIS — M77.42 METATARSALGIA OF BOTH FEET: ICD-10-CM

## 2022-01-19 DIAGNOSIS — L60.0 ONYCHOCRYPTOSIS: ICD-10-CM

## 2022-01-19 DIAGNOSIS — M77.41 METATARSALGIA OF BOTH FEET: ICD-10-CM

## 2022-01-19 DIAGNOSIS — L84 PRE-ULCERATIVE CALLUSES: ICD-10-CM

## 2022-01-19 PROCEDURE — 99213 OFFICE O/P EST LOW 20 MIN: CPT | Performed by: STUDENT IN AN ORGANIZED HEALTH CARE EDUCATION/TRAINING PROGRAM

## 2022-01-19 NOTE — PATIENT INSTRUCTIONS
-Discussed importance of proper pedal hygiene and regular foot checks.  -Patient to avoid walking barefoot.  -Patient to monitor for acute signs of infection and seek immediate medical attention if any signs of infection or other concerns arise.

## 2022-01-19 NOTE — PROGRESS NOTES
[facility] Podiatry  Progress Note    Angela Zazueta is a 78year old male. Patient presents with:  Callus: Patient want to check callus on both feet. Patient denies any pain at thsi time.    Toenail Care: Nail trim        HPI:     Patient is a pleasant 8 Hearing impairment    • High blood pressure    • High cholesterol    • Hypercholesterolemia    • Osteoarthritis of hip    • Other and unspecified personal history of malignant neoplasm     Prostate cancer. [place] 3,3.   Pretreatment psa 4.4   • Reflux otherwise  SKIN: denies any unusual skin lesions or rashes  RESPIRATORY: denies shortness of breath with exertion  CARDIOVASCULAR: denies chest pain on exertion  GI: denies abdominal pain and denies heartburn  NEURO: denies headaches  MUSCULO: denies arthr on acute signs of infection advised patient to seek immediate medical attention if symptoms arise. The patient indicates understanding of these issues and agrees to the plan. Return in about 2 months (around 3/19/2022) for routine foot care.     Juan Luis Villagomez

## 2022-03-23 ENCOUNTER — OFFICE VISIT (OUTPATIENT)
Dept: PODIATRY CLINIC | Facility: CLINIC | Age: 82
End: 2022-03-23
Payer: MEDICARE

## 2022-03-23 DIAGNOSIS — M77.41 METATARSALGIA OF BOTH FEET: ICD-10-CM

## 2022-03-23 DIAGNOSIS — M54.50 LEFT-SIDED LOW BACK PAIN WITHOUT SCIATICA, UNSPECIFIED CHRONICITY: ICD-10-CM

## 2022-03-23 DIAGNOSIS — M21.621 TAILOR'S BUNION OF BOTH FEET: ICD-10-CM

## 2022-03-23 DIAGNOSIS — L60.0 ONYCHOCRYPTOSIS: ICD-10-CM

## 2022-03-23 DIAGNOSIS — B35.1 ONYCHOMYCOSIS: Primary | ICD-10-CM

## 2022-03-23 DIAGNOSIS — M21.622 TAILOR'S BUNION OF BOTH FEET: ICD-10-CM

## 2022-03-23 DIAGNOSIS — M77.42 METATARSALGIA OF BOTH FEET: ICD-10-CM

## 2022-03-23 DIAGNOSIS — L84 HELOMA MOLLE: ICD-10-CM

## 2022-03-23 DIAGNOSIS — L84 PRE-ULCERATIVE CALLUSES: ICD-10-CM

## 2022-03-23 PROCEDURE — 99213 OFFICE O/P EST LOW 20 MIN: CPT | Performed by: STUDENT IN AN ORGANIZED HEALTH CARE EDUCATION/TRAINING PROGRAM

## 2022-03-23 NOTE — PATIENT INSTRUCTIONS
-Use urea cream and pumice stone to manage callus sites at all between visits.  -Follow-up in 2 months for routine foot care or sooner if other concerns arise.

## 2022-05-25 ENCOUNTER — OFFICE VISIT (OUTPATIENT)
Dept: PODIATRY CLINIC | Facility: CLINIC | Age: 82
End: 2022-05-25
Payer: MEDICARE

## 2022-05-25 DIAGNOSIS — B35.1 ONYCHOMYCOSIS: Primary | ICD-10-CM

## 2022-05-25 DIAGNOSIS — M21.622 TAILOR'S BUNION OF BOTH FEET: ICD-10-CM

## 2022-05-25 DIAGNOSIS — L84 HELOMA MOLLE: ICD-10-CM

## 2022-05-25 DIAGNOSIS — L60.0 ONYCHOCRYPTOSIS: ICD-10-CM

## 2022-05-25 DIAGNOSIS — M54.50 LEFT-SIDED LOW BACK PAIN WITHOUT SCIATICA, UNSPECIFIED CHRONICITY: ICD-10-CM

## 2022-05-25 DIAGNOSIS — L84 PRE-ULCERATIVE CALLUSES: ICD-10-CM

## 2022-05-25 DIAGNOSIS — M77.42 METATARSALGIA OF BOTH FEET: ICD-10-CM

## 2022-05-25 DIAGNOSIS — M77.41 METATARSALGIA OF BOTH FEET: ICD-10-CM

## 2022-05-25 DIAGNOSIS — M21.621 TAILOR'S BUNION OF BOTH FEET: ICD-10-CM

## 2022-05-25 PROCEDURE — 99213 OFFICE O/P EST LOW 20 MIN: CPT | Performed by: STUDENT IN AN ORGANIZED HEALTH CARE EDUCATION/TRAINING PROGRAM

## 2022-05-26 NOTE — PATIENT INSTRUCTIONS
- Use urea cream or moisturizer to callus sites daily. - Use pumice stone not manage calluses between visits. - Ambulate with supportive shoes and inserts.  - Follow-up in 2 to 3 months for reevaluation or sooner if other concerns arise.

## 2022-07-27 ENCOUNTER — OFFICE VISIT (OUTPATIENT)
Dept: PODIATRY CLINIC | Facility: CLINIC | Age: 82
End: 2022-07-27
Payer: MEDICARE

## 2022-07-27 DIAGNOSIS — B35.1 ONYCHOMYCOSIS: Primary | ICD-10-CM

## 2022-07-27 DIAGNOSIS — M77.42 METATARSALGIA OF BOTH FEET: ICD-10-CM

## 2022-07-27 DIAGNOSIS — M21.622 TAILOR'S BUNION OF BOTH FEET: ICD-10-CM

## 2022-07-27 DIAGNOSIS — L84 HELOMA MOLLE: ICD-10-CM

## 2022-07-27 DIAGNOSIS — M21.621 TAILOR'S BUNION OF BOTH FEET: ICD-10-CM

## 2022-07-27 DIAGNOSIS — L60.0 ONYCHOCRYPTOSIS: ICD-10-CM

## 2022-07-27 DIAGNOSIS — L84 PRE-ULCERATIVE CALLUSES: ICD-10-CM

## 2022-07-27 DIAGNOSIS — M77.41 METATARSALGIA OF BOTH FEET: ICD-10-CM

## 2022-07-27 PROCEDURE — 99213 OFFICE O/P EST LOW 20 MIN: CPT | Performed by: STUDENT IN AN ORGANIZED HEALTH CARE EDUCATION/TRAINING PROGRAM

## 2022-07-31 NOTE — PATIENT INSTRUCTIONS
-Discussed importance of proper pedal hygiene, regular foot checks.  -Patient to avoid walking barefoot. Ambulate with supportive shoes and inserts.  -Patient to monitor for acute signs of infection and seek immediate medical attention if any signs of infection or other concerns arise.

## 2022-09-28 ENCOUNTER — OFFICE VISIT (OUTPATIENT)
Dept: PODIATRY CLINIC | Facility: CLINIC | Age: 82
End: 2022-09-28

## 2022-09-28 DIAGNOSIS — M54.50 LEFT-SIDED LOW BACK PAIN WITHOUT SCIATICA, UNSPECIFIED CHRONICITY: ICD-10-CM

## 2022-09-28 DIAGNOSIS — B35.1 ONYCHOMYCOSIS: Primary | ICD-10-CM

## 2022-09-28 DIAGNOSIS — M21.622 TAILOR'S BUNION OF BOTH FEET: ICD-10-CM

## 2022-09-28 DIAGNOSIS — L84 PRE-ULCERATIVE CALLUSES: ICD-10-CM

## 2022-09-28 DIAGNOSIS — L84 HELOMA MOLLE: ICD-10-CM

## 2022-09-28 DIAGNOSIS — L60.0 ONYCHOCRYPTOSIS: ICD-10-CM

## 2022-09-28 DIAGNOSIS — M21.621 TAILOR'S BUNION OF BOTH FEET: ICD-10-CM

## 2022-09-28 DIAGNOSIS — M77.41 METATARSALGIA OF BOTH FEET: ICD-10-CM

## 2022-09-28 DIAGNOSIS — M77.42 METATARSALGIA OF BOTH FEET: ICD-10-CM

## 2022-09-28 PROCEDURE — 99213 OFFICE O/P EST LOW 20 MIN: CPT | Performed by: STUDENT IN AN ORGANIZED HEALTH CARE EDUCATION/TRAINING PROGRAM

## 2022-09-29 NOTE — PATIENT INSTRUCTIONS
- Ambulate with supportive shoes with wide toe box. Use toe spacers between toes to prevent rubbing. Follow-up in 2 months for routine foot care/reevaluation.

## 2022-11-30 ENCOUNTER — OFFICE VISIT (OUTPATIENT)
Dept: PODIATRY CLINIC | Facility: CLINIC | Age: 82
End: 2022-11-30
Payer: MEDICARE

## 2022-11-30 DIAGNOSIS — M77.42 METATARSALGIA OF BOTH FEET: ICD-10-CM

## 2022-11-30 DIAGNOSIS — M77.41 METATARSALGIA OF BOTH FEET: ICD-10-CM

## 2022-11-30 DIAGNOSIS — L60.0 ONYCHOCRYPTOSIS: ICD-10-CM

## 2022-11-30 DIAGNOSIS — M21.622 TAILOR'S BUNION OF BOTH FEET: ICD-10-CM

## 2022-11-30 DIAGNOSIS — M54.50 LEFT-SIDED LOW BACK PAIN WITHOUT SCIATICA, UNSPECIFIED CHRONICITY: ICD-10-CM

## 2022-11-30 DIAGNOSIS — M21.621 TAILOR'S BUNION OF BOTH FEET: ICD-10-CM

## 2022-11-30 DIAGNOSIS — L84 PRE-ULCERATIVE CALLUSES: ICD-10-CM

## 2022-11-30 DIAGNOSIS — B35.1 ONYCHOMYCOSIS: Primary | ICD-10-CM

## 2022-11-30 DIAGNOSIS — M79.674 TOE PAIN, BILATERAL: ICD-10-CM

## 2022-11-30 DIAGNOSIS — M79.675 TOE PAIN, BILATERAL: ICD-10-CM

## 2022-11-30 DIAGNOSIS — L84 HELOMA MOLLE: ICD-10-CM

## 2022-11-30 PROCEDURE — 99213 OFFICE O/P EST LOW 20 MIN: CPT | Performed by: STUDENT IN AN ORGANIZED HEALTH CARE EDUCATION/TRAINING PROGRAM

## 2022-11-30 NOTE — PATIENT INSTRUCTIONS
-Discussed importance of proper pedal hygiene, regular foot checks.  -Use toe spacer between 4th/5th toes to prevent rubbing on sites. Can consider surgical intervention if symptoms worsen or fail to improve.

## 2023-01-31 ENCOUNTER — OFFICE VISIT (OUTPATIENT)
Dept: PODIATRY CLINIC | Facility: CLINIC | Age: 83
End: 2023-01-31

## 2023-01-31 DIAGNOSIS — L84 PRE-ULCERATIVE CALLUSES: ICD-10-CM

## 2023-01-31 DIAGNOSIS — M77.42 METATARSALGIA OF BOTH FEET: ICD-10-CM

## 2023-01-31 DIAGNOSIS — M79.675 TOE PAIN, BILATERAL: ICD-10-CM

## 2023-01-31 DIAGNOSIS — M21.621 TAILOR'S BUNION OF BOTH FEET: ICD-10-CM

## 2023-01-31 DIAGNOSIS — L84 HELOMA MOLLE: ICD-10-CM

## 2023-01-31 DIAGNOSIS — M79.674 TOE PAIN, BILATERAL: ICD-10-CM

## 2023-01-31 DIAGNOSIS — B35.1 ONYCHOMYCOSIS: Primary | ICD-10-CM

## 2023-01-31 DIAGNOSIS — M77.41 METATARSALGIA OF BOTH FEET: ICD-10-CM

## 2023-01-31 DIAGNOSIS — M54.50 LEFT-SIDED LOW BACK PAIN WITHOUT SCIATICA, UNSPECIFIED CHRONICITY: ICD-10-CM

## 2023-01-31 DIAGNOSIS — L60.0 ONYCHOCRYPTOSIS: ICD-10-CM

## 2023-01-31 DIAGNOSIS — M21.622 TAILOR'S BUNION OF BOTH FEET: ICD-10-CM

## 2023-01-31 PROCEDURE — 99213 OFFICE O/P EST LOW 20 MIN: CPT | Performed by: STUDENT IN AN ORGANIZED HEALTH CARE EDUCATION/TRAINING PROGRAM

## 2023-04-21 ENCOUNTER — OFFICE VISIT (OUTPATIENT)
Dept: PODIATRY CLINIC | Facility: CLINIC | Age: 83
End: 2023-04-21

## 2023-04-21 DIAGNOSIS — B35.1 ONYCHOMYCOSIS: Primary | ICD-10-CM

## 2023-04-21 DIAGNOSIS — M79.671 PAIN IN BOTH FEET: ICD-10-CM

## 2023-04-21 DIAGNOSIS — M79.672 PAIN IN BOTH FEET: ICD-10-CM

## 2023-04-21 DIAGNOSIS — M77.41 METATARSALGIA OF BOTH FEET: ICD-10-CM

## 2023-04-21 DIAGNOSIS — M21.622 TAILOR'S BUNION OF BOTH FEET: ICD-10-CM

## 2023-04-21 DIAGNOSIS — L84 PRE-ULCERATIVE CALLUSES: ICD-10-CM

## 2023-04-21 DIAGNOSIS — M20.11 VALGUS DEFORMITY OF BOTH GREAT TOES: ICD-10-CM

## 2023-04-21 DIAGNOSIS — M20.12 VALGUS DEFORMITY OF BOTH GREAT TOES: ICD-10-CM

## 2023-04-21 DIAGNOSIS — M77.42 METATARSALGIA OF BOTH FEET: ICD-10-CM

## 2023-04-21 DIAGNOSIS — M79.674 TOE PAIN, BILATERAL: ICD-10-CM

## 2023-04-21 DIAGNOSIS — L84 HELOMA MOLLE: ICD-10-CM

## 2023-04-21 DIAGNOSIS — M21.621 TAILOR'S BUNION OF BOTH FEET: ICD-10-CM

## 2023-04-21 DIAGNOSIS — M54.50 LEFT-SIDED LOW BACK PAIN WITHOUT SCIATICA, UNSPECIFIED CHRONICITY: ICD-10-CM

## 2023-04-21 DIAGNOSIS — L60.0 ONYCHOCRYPTOSIS: ICD-10-CM

## 2023-04-21 DIAGNOSIS — M79.675 TOE PAIN, BILATERAL: ICD-10-CM

## 2023-04-21 PROCEDURE — 99213 OFFICE O/P EST LOW 20 MIN: CPT | Performed by: STUDENT IN AN ORGANIZED HEALTH CARE EDUCATION/TRAINING PROGRAM

## 2023-04-22 NOTE — PATIENT INSTRUCTIONS
-Ambulate with supportive shoes. Check feet daily. Follow-up in 2 months for reevaluation or sooner if other concerns arise.

## 2023-06-22 ENCOUNTER — OFFICE VISIT (OUTPATIENT)
Dept: PODIATRY CLINIC | Facility: CLINIC | Age: 83
End: 2023-06-22

## 2023-06-22 DIAGNOSIS — M54.50 LEFT-SIDED LOW BACK PAIN WITHOUT SCIATICA, UNSPECIFIED CHRONICITY: ICD-10-CM

## 2023-06-22 DIAGNOSIS — L84 HELOMA MOLLE: ICD-10-CM

## 2023-06-22 DIAGNOSIS — M79.674 TOE PAIN, BILATERAL: ICD-10-CM

## 2023-06-22 DIAGNOSIS — M77.41 METATARSALGIA OF BOTH FEET: ICD-10-CM

## 2023-06-22 DIAGNOSIS — M21.622 TAILOR'S BUNION OF BOTH FEET: ICD-10-CM

## 2023-06-22 DIAGNOSIS — M79.671 PAIN IN BOTH FEET: ICD-10-CM

## 2023-06-22 DIAGNOSIS — B35.1 ONYCHOMYCOSIS: Primary | ICD-10-CM

## 2023-06-22 DIAGNOSIS — M77.42 METATARSALGIA OF BOTH FEET: ICD-10-CM

## 2023-06-22 DIAGNOSIS — M20.11 VALGUS DEFORMITY OF BOTH GREAT TOES: ICD-10-CM

## 2023-06-22 DIAGNOSIS — M79.672 PAIN IN BOTH FEET: ICD-10-CM

## 2023-06-22 DIAGNOSIS — L84 PRE-ULCERATIVE CALLUSES: ICD-10-CM

## 2023-06-22 DIAGNOSIS — M21.621 TAILOR'S BUNION OF BOTH FEET: ICD-10-CM

## 2023-06-22 DIAGNOSIS — M79.675 TOE PAIN, BILATERAL: ICD-10-CM

## 2023-06-22 DIAGNOSIS — L60.0 ONYCHOCRYPTOSIS: ICD-10-CM

## 2023-06-22 DIAGNOSIS — M20.12 VALGUS DEFORMITY OF BOTH GREAT TOES: ICD-10-CM

## 2023-06-22 PROCEDURE — 99213 OFFICE O/P EST LOW 20 MIN: CPT | Performed by: STUDENT IN AN ORGANIZED HEALTH CARE EDUCATION/TRAINING PROGRAM

## 2023-06-23 NOTE — PATIENT INSTRUCTIONS
Ambulate with supportive shoes. Check feet daily. Follow-up in 2 to 3 months for reevaluation or sooner if other concerns arise.

## 2023-09-07 ENCOUNTER — OFFICE VISIT (OUTPATIENT)
Dept: PODIATRY CLINIC | Facility: CLINIC | Age: 83
End: 2023-09-07

## 2023-09-07 DIAGNOSIS — L84 PRE-ULCERATIVE CALLUSES: ICD-10-CM

## 2023-09-07 DIAGNOSIS — M77.42 METATARSALGIA OF BOTH FEET: ICD-10-CM

## 2023-09-07 DIAGNOSIS — M21.621 TAILOR'S BUNION OF BOTH FEET: ICD-10-CM

## 2023-09-07 DIAGNOSIS — M20.11 VALGUS DEFORMITY OF BOTH GREAT TOES: ICD-10-CM

## 2023-09-07 DIAGNOSIS — M21.622 TAILOR'S BUNION OF BOTH FEET: ICD-10-CM

## 2023-09-07 DIAGNOSIS — M79.674 TOE PAIN, BILATERAL: ICD-10-CM

## 2023-09-07 DIAGNOSIS — M54.50 LEFT-SIDED LOW BACK PAIN WITHOUT SCIATICA, UNSPECIFIED CHRONICITY: ICD-10-CM

## 2023-09-07 DIAGNOSIS — M79.675 TOE PAIN, BILATERAL: ICD-10-CM

## 2023-09-07 DIAGNOSIS — B35.1 ONYCHOMYCOSIS: Primary | ICD-10-CM

## 2023-09-07 DIAGNOSIS — M20.12 VALGUS DEFORMITY OF BOTH GREAT TOES: ICD-10-CM

## 2023-09-07 DIAGNOSIS — M77.41 METATARSALGIA OF BOTH FEET: ICD-10-CM

## 2023-09-07 DIAGNOSIS — L60.0 ONYCHOCRYPTOSIS: ICD-10-CM

## 2023-09-07 DIAGNOSIS — L84 HELOMA MOLLE: ICD-10-CM

## 2023-09-07 PROCEDURE — 99213 OFFICE O/P EST LOW 20 MIN: CPT | Performed by: STUDENT IN AN ORGANIZED HEALTH CARE EDUCATION/TRAINING PROGRAM

## 2023-09-08 NOTE — PATIENT INSTRUCTIONS
Ambulate with supportive shoes and silicone toe pad. Follow-up in 2 months or sooner if other concerns arise.

## 2023-09-08 NOTE — PROGRESS NOTES
[facility] Podiatry  Progress Note    Sanchez Pickens is a 78year old male. No chief complaint on file. HPI:     Patient is a pleasant 75-year-old male who returns to clinic for evaluation of elongated nails and thickened calluses he has difficulty trimming his own. He relates that sore/callus between his left fourth and fifth digits. He has been managing this fairly well with silicone toe pad. He does get fairly long period of relief after debridement but pain does tend to recur. He denies any open sores to his feet or other concerns. Past medical history, medications, and allergies reviewed. Allergies: Patient has no known allergies. Current Outpatient Medications   Medication Sig Dispense Refill    ATORVASTATIN 80 MG Oral Tab TAKE 1 TABLET BY MOUTH EVERY DAY IN THE EVENING 90 tablet 0    OMEPRAZOLE 40 MG Oral Capsule Delayed Release TAKE 1 CAPSULE BY MOUTH EVERY DAY 90 capsule 0    Omeprazole 40 MG Oral Capsule Delayed Release Take 1 capsule (40 mg total) by mouth daily. Before meal 30 capsule 0    methylPREDNISolone (MEDROL) 4 MG Oral Tab Take as per MDP. 21 tablet 0    LISINOPRIL 40 MG Oral Tab TAKE 1 TABLET BY MOUTH EVERY DAY 90 tablet 0    amLODIPine 5 MG Oral Tab Take 1 tablet (5 mg total) by mouth at bedtime. 90 tablet 3    cyclobenzaprine 5 MG Oral Tab Take 1 tablet (5 mg total) by mouth 3 (three) times daily as needed for Muscle spasms. 15 tablet 0    naproxen 500 MG Oral Tab Take 1 tablet (500 mg total) by mouth as needed. diphenhydrAMINE HCl 25 MG Oral Tab Take 1 tablet (25 mg total) by mouth every 6 (six) hours as needed for Itching. Simethicone 180 MG Oral Cap Take by mouth as needed. psyllium 28 % Oral Powd Pack Take 1 packet by mouth 2 (two) times daily. Cholecalciferol (VITAMIN D) 1000 units Oral Tab Take 100 Units by mouth daily. Past Medical History:   Diagnosis Date    Actinic keratosis - OSWALD Elliott 4/7/2014    Cancer [facility])     prostate    Colon cancer screening [6/2/13] recheck 10 years [At that time, is risk > benefit ?] -  JUANJOSE Sprague 5/17/2017    Diverticulosis of large intestine 2010    prior doctor told patient, walls thin, possibility of rupture; careful having a colonoscopy    H/O [ ~ ' 66 ] Rt sialadenitis [stone extraction] 1/16/2013    Hearing impairment     High blood pressure     High cholesterol     Hypercholesterolemia     Osteoarthritis of hip     Other and unspecified personal history of malignant neoplasm     Prostate cancer. [place] 3,3. Pretreatment psa 4.4    Reflux     S/P [ ~ ' 73 ] vasectomy  1/16/2013      Past Surgical History:   Procedure Laterality Date    COLONOSCOPY N/A 1/14/2021    Procedure: COLONOSCOPY;  Surgeon: Angeline Carson MD;  Location: [facility] 1.1-2CM TRUNK,ARM,LEG N/A 10/16/2014    Procedure: EXCISION OF SEBACEOUS CYST;  Surgeon: Juanita Parks MD;  Location: [facility]    HIP REPLACEMENT SURGERY      right hip -2004 - dr Melissa Nice      excision of seb scys on back- Dr. Josefina Quan N/A 10/16/2014    Procedure: EXCISION OF SEBACEOUS CYST;  Surgeon: Juanita Parks MD;  Location: [facility]    PATIENT [facility] IV ANTIBIOTIC SURGICAL SITE INFECTION PROPHYLAXIS.  N/A 10/16/2014    Procedure: EXCISION OF SEBACEOUS CYST;  Surgeon: Juanita Parks MD;  Location: [address] [place] [address] OR REPORT  Aug 2006    Brachytherapy of the prostate    TONSILLECTOMY      TOTAL HIP REPLACEMENT Bilateral     TOTAL KNEE REPLACEMENT Left       Family History   Problem Relation Age of Onset    Pulmonary Disease Mother         copd    Heart Disease Sister         atrial fibrillation    Heart Attack Father       Social History    Socioeconomic History      Marital status:     Tobacco Use      Smoking status: Former      Smokeless tobacco: Never      Tobacco comments: quit cigarettes 2006    Vaping Use      Vaping Use: Never used    Substance and Sexual Activity      Alcohol use: Yes        Comment: SOCIAL. Drug use: No          REVIEW OF SYSTEMS:     Today reviewed systems as documented below  GENERAL HEALTH: feels well otherwise  SKIN: denies any unusual skin lesions or rashes  RESPIRATORY: denies shortness of breath with exertion  CARDIOVASCULAR: denies chest pain on exertion  GI: denies abdominal pain and denies heartburn  NEURO: denies headaches  MUSCULO: denies arthritis, back pain      EXAM:   There were no vitals taken for this visit. GENERAL: well developed, well nourished, in no apparent distress  EXTREMITIES:   1. Integument: Normal skin temperature and turgor. Nails x10 are elongated, thickened, dystrophic, subungual debris. Hallux nails are incurvated bilaterally. Hyperkeratotic lesion noted between fourth and fifth digits of left foot consistent with heloma molle. No open lesions or acute signs of infection are noted. 2. Vascular: Dorsalis pedis two out of four bilateral and posterior tibial pulses two out of    four bilateral, capillary refill normal.   3. Musculoskeletal: All muscle groups are graded 5 out of 5 in the foot and ankle. Decreased plantar fat pad noted bilaterally. HAV deformity noted bilaterally with tailor bunion deformity noted bilaterally. Prominent metatarsal heads noted bilaterally. 4. Neurological: Normal sharp dull sensation; reflexes normal.          ASSESSMENT AND PLAN:   Diagnoses and all orders for this visit:    Onychomycosis    Onychocryptosis    Metatarsalgia of both feet    Heloma molle    Toe pain, bilateral    Pre-ulcerative calluses    Tailor's bunion of both feet    Left-sided low back pain without sciatica, unspecified chronicity    Valgus deformity of both great toes        Plan:   -Patient examined, chart history reviewed.   -Discussed importance of proper pedal hygiene and regular foot checks.  -Sharply debrided nails x10 with a sterile nail nipper achieving a 20% reduction in thickness and length, without incident. Nails further smoothed with dremel.  -Pared hyperkeratotic lesion x 1 with 15 blade to healthy tissue without incident.  -Patient can use gauze or toe spacer between fourth and fifth digits of left foot to prevent recurrence of heloma molle.  -Patient can continue using Aquaphor to moisturize sites on his own.   -He can use pumice stone to manage calluses on his own between visits.  -Patient to continue using supportive shoe gear and inserts to help offload deformities. -If symptoms worsen or fail to improve can consider surgical intervention.  -Educated patient on acute signs of infection advised patient to seek immediate medical attention if symptoms arise. The patient indicates understanding of these issues and agrees to the plan. Return in about 2 months (around 11/7/2023) for routine foot care.     Sammuel Heimlich, DPM

## 2023-11-08 ENCOUNTER — OFFICE VISIT (OUTPATIENT)
Dept: PODIATRY CLINIC | Facility: CLINIC | Age: 83
End: 2023-11-08
Payer: MEDICARE

## 2023-11-08 DIAGNOSIS — M79.674 TOE PAIN, BILATERAL: ICD-10-CM

## 2023-11-08 DIAGNOSIS — M21.622 TAILOR'S BUNION OF BOTH FEET: ICD-10-CM

## 2023-11-08 DIAGNOSIS — L60.0 ONYCHOCRYPTOSIS: ICD-10-CM

## 2023-11-08 DIAGNOSIS — L84 HELOMA MOLLE: ICD-10-CM

## 2023-11-08 DIAGNOSIS — M79.675 TOE PAIN, BILATERAL: ICD-10-CM

## 2023-11-08 DIAGNOSIS — M77.41 METATARSALGIA OF BOTH FEET: ICD-10-CM

## 2023-11-08 DIAGNOSIS — L84 PRE-ULCERATIVE CALLUSES: ICD-10-CM

## 2023-11-08 DIAGNOSIS — M21.621 TAILOR'S BUNION OF BOTH FEET: ICD-10-CM

## 2023-11-08 DIAGNOSIS — B35.1 ONYCHOMYCOSIS: Primary | ICD-10-CM

## 2023-11-08 DIAGNOSIS — M77.42 METATARSALGIA OF BOTH FEET: ICD-10-CM

## 2023-11-08 PROCEDURE — 99213 OFFICE O/P EST LOW 20 MIN: CPT | Performed by: STUDENT IN AN ORGANIZED HEALTH CARE EDUCATION/TRAINING PROGRAM

## 2023-11-08 NOTE — PROGRESS NOTES
[address] [facility] Podiatry  Progress Note    Sigrid Low is a 78year old male. Chief Complaint   Patient presents with    Toenail Care     Nails care and callus check- constant pain on bilateral feet - wants to speak about orthotics- patient denies pain at this time. HPI:     Patient is a pleasant 78-year-old male who returns to clinic for evaluation of elongated nails and thickened calluses he has difficulty trimming his own. He relates that sore/callus between his left fourth and fifth digits. He has been managing this fairly well with silicone toe pad. He does get fairly long period of relief after debridement but pain does tend to recur. He denies any open sores to his feet or other concerns. He is hoping for new pair orthotics as his current pair is starting to cause different pressure points and become bothersome. Past medical history, medications, and allergies reviewed. Allergies: Patient has no known allergies. Current Outpatient Medications   Medication Sig Dispense Refill    ATORVASTATIN 80 MG Oral Tab TAKE 1 TABLET BY MOUTH EVERY DAY IN THE EVENING 90 tablet 0    naproxen 500 MG Oral Tab Take 1 tablet (500 mg total) by mouth as needed. diphenhydrAMINE HCl 25 MG Oral Tab Take 1 tablet (25 mg total) by mouth every 6 (six) hours as needed for Itching. Simethicone 180 MG Oral Cap Take by mouth as needed. psyllium 28 % Oral Powd Pack Take 1 packet by mouth 2 (two) times daily. Cholecalciferol (VITAMIN D) 1000 units Oral Tab Take 100 Units by mouth daily. OMEPRAZOLE 40 MG Oral Capsule Delayed Release TAKE 1 CAPSULE BY MOUTH EVERY DAY 90 capsule 0    Omeprazole 40 MG Oral Capsule Delayed Release Take 1 capsule (40 mg total) by mouth daily. Before meal 30 capsule 0    methylPREDNISolone (MEDROL) 4 MG Oral Tab Take as per MDP.  21 tablet 0    LISINOPRIL 40 MG Oral Tab TAKE 1 TABLET BY MOUTH EVERY DAY 90 tablet 0    amLODIPine 5 MG Oral Tab Take 1 tablet (5 mg total) by mouth at bedtime. 90 tablet 3    cyclobenzaprine 5 MG Oral Tab Take 1 tablet (5 mg total) by mouth 3 (three) times daily as needed for Muscle spasms. 15 tablet 0      Past Medical History:   Diagnosis Date    Actinic keratosis - OSWALD [facility] 4/7/2014    Cancer [facility])     prostate    Colon cancer screening [6/2/13] recheck 10 years [At that time, is risk > benefit ?] -  JUANJOSE Sprague 5/17/2017    Diverticulosis of large intestine 2010    prior doctor told patient, walls thin, possibility of rupture; careful having a colonoscopy    H/O [ ~ ' 66 ] Rt sialadenitis [stone extraction] 1/16/2013    Hearing impairment     High blood pressure     High cholesterol     Hypercholesterolemia     Osteoarthritis of hip     Other and unspecified personal history of malignant neoplasm     Prostate cancer. [place] 3,3. Pretreatment psa 4.4    Reflux     S/P [ ~ ' 73 ] vasectomy  1/16/2013      Past Surgical History:   Procedure Laterality Date    COLONOSCOPY N/A 1/14/2021    Procedure: COLONOSCOPY;  Surgeon: Angeline Carson MD;  Location: [facility] 1.1-2CM TRUNK,ARM,LEG N/A 10/16/2014    Procedure: EXCISION OF SEBACEOUS CYST;  Surgeon: Juanita Parks MD;  Location: [facility]    HIP REPLACEMENT SURGERY      right hip -2004 - dr Melissa Nice      excision of seb scys on back- Dr. Josefina Quan N/A 10/16/2014    Procedure: EXCISION OF SEBACEOUS CYST;  Surgeon: Juanita Parks MD;  Location: [facility]    PATIENT [facility] IV ANTIBIOTIC SURGICAL SITE INFECTION PROPHYLAXIS.  N/A 10/16/2014    Procedure: EXCISION OF SEBACEOUS CYST;  Surgeon: Juanita Parks MD;  Location: [address] OR REPORT  Aug 2006    Brachytherapy of the prostate    TONSILLECTOMY      TOTAL HIP REPLACEMENT Bilateral     TOTAL KNEE REPLACEMENT Left       Family History   Problem Relation Age of Onset Pulmonary Disease Mother         copd    Heart Disease Sister         atrial fibrillation    Heart Attack Father       Social History     Socioeconomic History    Marital status:    Tobacco Use    Smoking status: Former    Smokeless tobacco: Never    Tobacco comments:     quit cigarettes 2006   Vaping Use    Vaping Use: Never used   Substance and Sexual Activity    Alcohol use: Yes     Comment: SOCIAL. Drug use: No           REVIEW OF SYSTEMS:     Today reviewed systems as documented below  GENERAL HEALTH: feels well otherwise  SKIN: denies any unusual skin lesions or rashes  RESPIRATORY: denies shortness of breath with exertion  CARDIOVASCULAR: denies chest pain on exertion  GI: denies abdominal pain and denies heartburn  NEURO: denies headaches  MUSCULO: History of arthritis      EXAM:   There were no vitals taken for this visit. GENERAL: well developed, well nourished, in no apparent distress  EXTREMITIES:   1. Integument: Normal skin temperature and turgor. Nails x10 are elongated, thickened, dystrophic, subungual debris. Hallux nails are incurvated bilaterally. Hyperkeratotic lesion noted between fourth and fifth digits of left foot consistent with heloma molle. HPK noted to plantar fifth metatarsal head bilaterally. No open lesions or acute signs of infection are noted. 2. Vascular: Dorsalis pedis two out of four bilateral and posterior tibial pulses two out of    four bilateral, capillary refill normal.   3. Musculoskeletal: All muscle groups are graded 5 out of 5 in the foot and ankle. Decreased plantar fat pad noted bilaterally. HAV deformity noted bilaterally with tailor bunion deformity noted bilaterally. Prominent metatarsal heads noted bilaterally.    4. Neurological: Normal sharp dull sensation; reflexes normal.          ASSESSMENT AND PLAN:   Diagnoses and all orders for this visit:    Onychomycosis    Onychocryptosis    Metatarsalgia of both feet    Heloma molle    Toe pain, bilateral    Pre-ulcerative calluses    Tailor's bunion of both feet        Plan:   -Patient examined, chart history reviewed. -Discussed importance of proper pedal hygiene and regular foot checks.  -Sharply debrided nails x10 with a sterile nail nipper achieving a 20% reduction in thickness and length, without incident. Nails further smoothed with dremel.  -Pared HPK's x3 with #15 blade healthy tissue without incident.  -Patient can use gauze or toe spacer between fourth and fifth digits of left foot to prevent recurrence of heloma molle.  -Patient can continue using Aquaphor to moisturize sites on his own.   -He can use pumice stone to manage calluses on his own between visits.  -Patient to continue using supportive shoe gear and inserts to help offload deformities. Referral placed for new custom inserts. -If symptoms worsen or fail to improve can consider surgical intervention.  -Educated patient on acute signs of infection advised patient to seek immediate medical attention if symptoms arise. The patient indicates understanding of these issues and agrees to the plan. RTC 2 months.     Dez Boo DPM

## 2024-01-10 ENCOUNTER — OFFICE VISIT (OUTPATIENT)
Dept: PODIATRY CLINIC | Facility: CLINIC | Age: 84
End: 2024-01-10
Payer: MEDICARE

## 2024-01-10 DIAGNOSIS — M79.674 TOE PAIN, BILATERAL: ICD-10-CM

## 2024-01-10 DIAGNOSIS — L84 PRE-ULCERATIVE CALLUSES: ICD-10-CM

## 2024-01-10 DIAGNOSIS — M79.675 TOE PAIN, BILATERAL: ICD-10-CM

## 2024-01-10 DIAGNOSIS — M21.622 TAILOR'S BUNION OF BOTH FEET: ICD-10-CM

## 2024-01-10 DIAGNOSIS — L84 HELOMA MOLLE: ICD-10-CM

## 2024-01-10 DIAGNOSIS — M21.621 TAILOR'S BUNION OF BOTH FEET: ICD-10-CM

## 2024-01-10 DIAGNOSIS — M77.41 METATARSALGIA OF BOTH FEET: ICD-10-CM

## 2024-01-10 DIAGNOSIS — L60.0 ONYCHOCRYPTOSIS: ICD-10-CM

## 2024-01-10 DIAGNOSIS — B35.1 ONYCHOMYCOSIS: Primary | ICD-10-CM

## 2024-01-10 DIAGNOSIS — M77.42 METATARSALGIA OF BOTH FEET: ICD-10-CM

## 2024-01-10 PROCEDURE — 99213 OFFICE O/P EST LOW 20 MIN: CPT | Performed by: STUDENT IN AN ORGANIZED HEALTH CARE EDUCATION/TRAINING PROGRAM

## 2024-01-10 NOTE — PROGRESS NOTES
[facility] Podiatry  Progress Note    Andrzej Morley is a 83 year old male.   Chief Complaint   Patient presents with    Toenail Care     Nail and callus care. Foot check.          HPI:     Patient is a pleasant 83-year-old male who returns to clinic for evaluation of elongated nails and thickened calluses he has difficulty trimming his own.  He gets recurrent callus between his left fourth and fifth digits.  He has been managing this fairly well with silicone toe pad.   He denies any open sores to his feet or other concerns.  Past medical history, medications, and allergies reviewed.      Allergies: Patient has no known allergies.   Current Outpatient Medications   Medication Sig Dispense Refill    ATORVASTATIN 80 MG Oral Tab TAKE 1 TABLET BY MOUTH EVERY DAY IN THE EVENING 90 tablet 0    naproxen 500 MG Oral Tab Take 1 tablet (500 mg total) by mouth as needed.      diphenhydrAMINE HCl 25 MG Oral Tab Take 1 tablet (25 mg total) by mouth every 6 (six) hours as needed for Itching.      Simethicone 180 MG Oral Cap Take by mouth as needed.      psyllium 28 % Oral Powd Pack Take 1 packet by mouth 2 (two) times daily.      Cholecalciferol (VITAMIN D) 1000 units Oral Tab Take 100 Units by mouth daily.      OMEPRAZOLE 40 MG Oral Capsule Delayed Release TAKE 1 CAPSULE BY MOUTH EVERY DAY 90 capsule 0    Omeprazole 40 MG Oral Capsule Delayed Release Take 1 capsule (40 mg total) by mouth daily. Before meal 30 capsule 0    methylPREDNISolone (MEDROL) 4 MG Oral Tab Take as per MDP. 21 tablet 0    LISINOPRIL 40 MG Oral Tab TAKE 1 TABLET BY MOUTH EVERY DAY 90 tablet 0    amLODIPine 5 MG Oral Tab Take 1 tablet (5 mg total) by mouth at bedtime. 90 tablet 3    cyclobenzaprine 5 MG Oral Tab Take 1 tablet (5 mg total) by mouth 3 (three) times daily as needed for Muscle spasms. 15 tablet 0      Past Medical History:   Diagnosis Date    Actinic keratosis - OSWALD Lee 4/7/2014    Cancer ([facility])     prostate    Colon cancer screening [6/2/13]  recheck 10 years [At that time, is risk > benefit ?] -  JUANJOSE Zee 5/17/2017    Diverticulosis of large intestine 2010    prior doctor told patient, walls thin, possibility of rupture; careful having a colonoscopy    H/O [ ~ ' 66 ] Rt sialadenitis [stone extraction] 1/16/2013    Hearing impairment     High blood pressure     High cholesterol     Hypercholesterolemia     Osteoarthritis of hip     Other and unspecified personal history of malignant neoplasm     Prostate cancer.  [place] 3,3.  Pretreatment psa 4.4    Reflux     S/P [ ~ ' 73 ] vasectomy  1/16/2013      Past Surgical History:   Procedure Laterality Date    COLONOSCOPY N/A 1/14/2021    Procedure: COLONOSCOPY;  Surgeon: Michael Funes MD;  Location:  ENDOSCOPY    EXC SKIN BENIG 1.1-2CM TRUNK,ARM,LEG N/A 10/16/2014    Procedure: EXCISION OF SEBACEOUS CYST;  Surgeon: Issac Gonzalez MD;  Location: [facility]    HIP REPLACEMENT SURGERY      right hip -2004 - dr merlos    OTHER SURGICAL HISTORY      excision of michelle scys on back- Dr. [id number]    PATIENT DOCUMENTED NOT TO HAVE EXPERIENCED ANY OF THE FOLLOWING EVENTS N/A 10/16/2014    Procedure: EXCISION OF SEBACEOUS CYST;  Surgeon: Issac Gonzalez MD;  Location: [facility]    PATIENT WITHOUGH PREOPERATIVE ORDER FOR IV ANTIBIOTIC SURGICAL SITE INFECTION PROPHYLAXIS. N/A 10/16/2014    Procedure: EXCISION OF SEBACEOUS CYST;  Surgeon: Issac Gonzalez MD;  Location: [facility]    SPECIAL SERVICE OR REPORT  Aug 2006    Brachytherapy of the prostate    TONSILLECTOMY      TOTAL HIP REPLACEMENT Bilateral     TOTAL KNEE REPLACEMENT Left       Family History   Problem Relation Age of Onset    Pulmonary Disease Mother         copd    Heart Disease Sister         atrial fibrillation    Heart Attack Father       Social History     Socioeconomic History    Marital status:    Tobacco Use    Smoking status: Former    Smokeless tobacco: Never    Tobacco comments:     quit cigarettes 2006   Vaping Use     Vaping Use: Never used   Substance and Sexual Activity    Alcohol use: Yes     Comment: SOCIAL.    Drug use: No           REVIEW OF SYSTEMS:     Today reviewed systems as documented below  GENERAL HEALTH: feels well otherwise  SKIN: denies any unusual skin lesions or rashes  RESPIRATORY: denies shortness of breath with exertion  CARDIOVASCULAR: denies chest pain on exertion  GI: denies abdominal pain and denies heartburn  NEURO: denies headaches  MUSCULO: History of arthritis      EXAM:   There were no vitals taken for this visit.  GENERAL: well developed, well nourished, in no apparent distress  EXTREMITIES:   1. Integument: Normal skin temperature and turgor.  Nails x10 are elongated, thickened, dystrophic, subungual debris.  Hallux nails are incurvated bilaterally.   Hyperkeratotic lesion noted between fourth and fifth digits of left foot consistent with heloma molle.   No open lesions or acute signs of infection are noted.  2. Vascular: Dorsalis pedis two out of four bilateral and posterior tibial pulses two out of    four bilateral, capillary refill normal.   3. Musculoskeletal: All muscle groups are graded 5 out of 5 in the foot and ankle.  Decreased plantar fat pad noted bilaterally.  HAV deformity noted bilaterally with tailor bunion deformity noted bilaterally.  Prominent metatarsal heads noted bilaterally.   4. Neurological: Normal sharp dull sensation; reflexes normal.          ASSESSMENT AND PLAN:   Diagnoses and all orders for this visit:    Onychomycosis    Onychocryptosis    Metatarsalgia of both feet    Heloma molle    Toe pain, bilateral    Pre-ulcerative calluses    Tailor's bunion of both feet        Plan:   -Patient examined, chart history reviewed.  -Discussed importance of proper pedal hygiene and regular foot checks.  -Sharply debrided nails x10 with a sterile nail nipper achieving a 20% reduction in thickness and length, without incident. Nails further smoothed with dremel.  -Pared K's  x1 with #15 blade healthy tissue without incident.  -Patient can use gauze or toe spacer between fourth and fifth digits of left foot to prevent recurrence of heloma molle.  -Patient can continue using Aquaphor to moisturize sites on his own.   -If symptoms worsen or fail to improve can consider surgical intervention.  -Educated patient on acute signs of infection advised patient to seek immediate medical attention if symptoms arise.    The patient indicates understanding of these issues and agrees to the plan.    RTC 2 months.    Jorgito Garcia DPM

## 2024-01-17 ENCOUNTER — TELEPHONE (OUTPATIENT)
Dept: PODIATRY CLINIC | Facility: CLINIC | Age: 84
End: 2024-01-17

## 2024-01-17 NOTE — TELEPHONE ENCOUNTER
Per patient stating he had made an appt with [facility] today for new orthotics however weren't able to proceed because  they asked for an appt card from Dr Garcia? Please advise

## 2024-01-19 NOTE — TELEPHONE ENCOUNTER
Pt states he just spoke to Rafael ramos and they did not receive fax - pt asking for call after its been re sent

## 2024-01-19 NOTE — TELEPHONE ENCOUNTER
Order faxed to [facility] in [place] at [phone]. Called pt LMTCB but did not leave details as VM did not have identifying pt info- if pt CB you can let him know order was resent.

## 2024-02-08 RX ORDER — FAMOTIDINE 20 MG/1
20 TABLET, FILM COATED ORAL 2 TIMES DAILY
COMMUNITY

## 2024-02-08 RX ORDER — AMLODIPINE BESYLATE 5 MG/1
5 TABLET ORAL NIGHTLY
COMMUNITY

## 2024-02-08 RX ORDER — SILODOSIN 8 MG/1
8 CAPSULE ORAL EVERY EVENING
COMMUNITY

## 2024-02-21 ENCOUNTER — LABORATORY ENCOUNTER (OUTPATIENT)
Dept: LAB | Facility: HOSPITAL | Age: 84
End: 2024-02-21
Attending: UROLOGY
Payer: MEDICARE

## 2024-02-21 ENCOUNTER — EKG ENCOUNTER (OUTPATIENT)
Dept: LAB | Facility: HOSPITAL | Age: 84
End: 2024-02-21
Attending: UROLOGY
Payer: MEDICARE

## 2024-02-21 DIAGNOSIS — Z01.818 PRE-OP TESTING: ICD-10-CM

## 2024-03-06 ENCOUNTER — APPOINTMENT (OUTPATIENT)
Dept: GENERAL RADIOLOGY | Facility: HOSPITAL | Age: 84
End: 2024-03-06
Attending: UROLOGY
Payer: MEDICARE

## 2024-03-06 ENCOUNTER — ANESTHESIA (OUTPATIENT)
Dept: SURGERY | Facility: HOSPITAL | Age: 84
End: 2024-03-06
Payer: MEDICARE

## 2024-03-06 ENCOUNTER — HOSPITAL ENCOUNTER (OUTPATIENT)
Facility: HOSPITAL | Age: 84
Setting detail: HOSPITAL OUTPATIENT SURGERY
Discharge: HOME | End: 2024-03-06
Attending: UROLOGY | Admitting: UROLOGY
Payer: MEDICARE

## 2024-03-06 ENCOUNTER — ANESTHESIA EVENT (OUTPATIENT)
Dept: SURGERY | Facility: HOSPITAL | Age: 84
End: 2024-03-06
Payer: MEDICARE

## 2024-03-06 VITALS
HEIGHT: 70.5 IN | DIASTOLIC BLOOD PRESSURE: 70 MMHG | WEIGHT: 200 LBS | SYSTOLIC BLOOD PRESSURE: 124 MMHG | OXYGEN SATURATION: 92 % | TEMPERATURE: 97 F | HEART RATE: 68 BPM | BODY MASS INDEX: 28.31 KG/M2 | RESPIRATION RATE: 17 BRPM

## 2024-03-06 DIAGNOSIS — Z01.818 PRE-OP TESTING: Primary | ICD-10-CM

## 2024-03-06 PROCEDURE — 0VT08ZZ RESECTION OF PROSTATE, VIA NATURAL OR ARTIFICIAL OPENING ENDOSCOPIC: ICD-10-PCS | Performed by: UROLOGY

## 2024-03-06 RX ORDER — NEOSTIGMINE METHYLSULFATE 1 MG/ML
INJECTION, SOLUTION INTRAVENOUS AS NEEDED
Status: DISCONTINUED | OUTPATIENT
Start: 2024-03-06 | End: 2024-03-06 | Stop reason: SURG

## 2024-03-06 RX ORDER — ONDANSETRON 2 MG/ML
INJECTION INTRAMUSCULAR; INTRAVENOUS AS NEEDED
Status: DISCONTINUED | OUTPATIENT
Start: 2024-03-06 | End: 2024-03-06 | Stop reason: SURG

## 2024-03-06 RX ORDER — HYDROCODONE BITARTRATE AND ACETAMINOPHEN 5; 325 MG/1; MG/1
1 TABLET ORAL ONCE AS NEEDED
Status: COMPLETED | OUTPATIENT
Start: 2024-03-06 | End: 2024-03-06

## 2024-03-06 RX ORDER — SULFAMETHOXAZOLE AND TRIMETHOPRIM 800; 160 MG/1; MG/1
1 TABLET ORAL 2 TIMES DAILY
Qty: 14 TABLET | Refills: 0 | Status: SHIPPED | OUTPATIENT
Start: 2024-03-06 | End: 2024-03-13

## 2024-03-06 RX ORDER — ONDANSETRON 2 MG/ML
4 INJECTION INTRAMUSCULAR; INTRAVENOUS EVERY 6 HOURS PRN
Status: DISCONTINUED | OUTPATIENT
Start: 2024-03-06 | End: 2024-03-06

## 2024-03-06 RX ORDER — HYDROCODONE BITARTRATE AND ACETAMINOPHEN 5; 325 MG/1; MG/1
2 TABLET ORAL ONCE AS NEEDED
Status: COMPLETED | OUTPATIENT
Start: 2024-03-06 | End: 2024-03-06

## 2024-03-06 RX ORDER — DEXAMETHASONE SODIUM PHOSPHATE 4 MG/ML
VIAL (ML) INJECTION AS NEEDED
Status: DISCONTINUED | OUTPATIENT
Start: 2024-03-06 | End: 2024-03-06 | Stop reason: SURG

## 2024-03-06 RX ORDER — METOCLOPRAMIDE HYDROCHLORIDE 5 MG/ML
10 INJECTION INTRAMUSCULAR; INTRAVENOUS EVERY 8 HOURS PRN
Status: DISCONTINUED | OUTPATIENT
Start: 2024-03-06 | End: 2024-03-06

## 2024-03-06 RX ORDER — ROCURONIUM BROMIDE 10 MG/ML
INJECTION, SOLUTION INTRAVENOUS AS NEEDED
Status: DISCONTINUED | OUTPATIENT
Start: 2024-03-06 | End: 2024-03-06 | Stop reason: SURG

## 2024-03-06 RX ORDER — SODIUM CHLORIDE, SODIUM LACTATE, POTASSIUM CHLORIDE, CALCIUM CHLORIDE 600; 310; 30; 20 MG/100ML; MG/100ML; MG/100ML; MG/100ML
INJECTION, SOLUTION INTRAVENOUS CONTINUOUS
Status: DISCONTINUED | OUTPATIENT
Start: 2024-03-06 | End: 2024-03-06

## 2024-03-06 RX ORDER — LIDOCAINE HYDROCHLORIDE 10 MG/ML
INJECTION, SOLUTION EPIDURAL; INFILTRATION; INTRACAUDAL; PERINEURAL AS NEEDED
Status: DISCONTINUED | OUTPATIENT
Start: 2024-03-06 | End: 2024-03-06 | Stop reason: SURG

## 2024-03-06 RX ORDER — HYDROMORPHONE HYDROCHLORIDE 1 MG/ML
0.4 INJECTION, SOLUTION INTRAMUSCULAR; INTRAVENOUS; SUBCUTANEOUS EVERY 5 MIN PRN
Status: DISCONTINUED | OUTPATIENT
Start: 2024-03-06 | End: 2024-03-06

## 2024-03-06 RX ORDER — NALOXONE HYDROCHLORIDE 0.4 MG/ML
0.08 INJECTION, SOLUTION INTRAMUSCULAR; INTRAVENOUS; SUBCUTANEOUS AS NEEDED
Status: DISCONTINUED | OUTPATIENT
Start: 2024-03-06 | End: 2024-03-06

## 2024-03-06 RX ORDER — ACETAMINOPHEN 500 MG
1000 TABLET ORAL ONCE
Status: DISCONTINUED | OUTPATIENT
Start: 2024-03-06 | End: 2024-03-06 | Stop reason: HOSPADM

## 2024-03-06 RX ORDER — HYDROMORPHONE HYDROCHLORIDE 1 MG/ML
0.2 INJECTION, SOLUTION INTRAMUSCULAR; INTRAVENOUS; SUBCUTANEOUS EVERY 5 MIN PRN
Status: DISCONTINUED | OUTPATIENT
Start: 2024-03-06 | End: 2024-03-06

## 2024-03-06 RX ORDER — CEFAZOLIN SODIUM/WATER 2 G/20 ML
2 SYRINGE (ML) INTRAVENOUS ONCE
Status: COMPLETED | OUTPATIENT
Start: 2024-03-06 | End: 2024-03-06

## 2024-03-06 RX ORDER — ACETAMINOPHEN 500 MG
1000 TABLET ORAL ONCE AS NEEDED
Status: COMPLETED | OUTPATIENT
Start: 2024-03-06 | End: 2024-03-06

## 2024-03-06 RX ORDER — HYDROMORPHONE HYDROCHLORIDE 1 MG/ML
0.6 INJECTION, SOLUTION INTRAMUSCULAR; INTRAVENOUS; SUBCUTANEOUS EVERY 5 MIN PRN
Status: DISCONTINUED | OUTPATIENT
Start: 2024-03-06 | End: 2024-03-06

## 2024-03-06 RX ORDER — GLYCOPYRROLATE 0.2 MG/ML
INJECTION, SOLUTION INTRAMUSCULAR; INTRAVENOUS AS NEEDED
Status: DISCONTINUED | OUTPATIENT
Start: 2024-03-06 | End: 2024-03-06 | Stop reason: SURG

## 2024-03-06 RX ADMIN — GLYCOPYRROLATE 0.4 MG: 0.2 INJECTION, SOLUTION INTRAMUSCULAR; INTRAVENOUS at 17:27:00

## 2024-03-06 RX ADMIN — LIDOCAINE HYDROCHLORIDE 50 MG: 10 INJECTION, SOLUTION EPIDURAL; INFILTRATION; INTRACAUDAL; PERINEURAL at 16:48:00

## 2024-03-06 RX ADMIN — CEFAZOLIN SODIUM/WATER 2 G: 2 G/20 ML SYRINGE (ML) INTRAVENOUS at 16:52:00

## 2024-03-06 RX ADMIN — SODIUM CHLORIDE, SODIUM LACTATE, POTASSIUM CHLORIDE, CALCIUM CHLORIDE: 600; 310; 30; 20 INJECTION, SOLUTION INTRAVENOUS at 17:43:00

## 2024-03-06 RX ADMIN — ROCURONIUM BROMIDE 50 MG: 10 INJECTION, SOLUTION INTRAVENOUS at 16:49:00

## 2024-03-06 RX ADMIN — DEXAMETHASONE SODIUM PHOSPHATE 8 MG: 4 MG/ML VIAL (ML) INJECTION at 16:54:00

## 2024-03-06 RX ADMIN — ONDANSETRON 4 MG: 2 INJECTION INTRAMUSCULAR; INTRAVENOUS at 17:22:00

## 2024-03-06 RX ADMIN — NEOSTIGMINE METHYLSULFATE 4.5 MG: 1 INJECTION, SOLUTION INTRAVENOUS at 17:27:00

## 2024-03-06 NOTE — ANESTHESIA PROCEDURE NOTES
Airway  Date/Time: 3/6/2024 4:50 PM  Urgency: elective    Airway not difficult    General Information and Staff    Patient location during procedure: OR  Anesthesiologist: Link Trinh DO  Performed: anesthesiologist   Performed by: Link Trinh DO  Authorized by: Link Trinh DO      Indications and Patient Condition  Indications for airway management: anesthesia  Sedation level: deep  Preoxygenated: yes  Patient position: sniffing  Mask difficulty assessment: 1 - vent by mask    Final Airway Details  Final airway type: endotracheal airway      Successful airway: ETT  Cuffed: yes   Successful intubation technique: Video laryngoscopy  Endotracheal tube insertion site: oral  Blade: GlideScope  Blade size: #4  ETT size (mm): 7.5    Cormack-Lehane Classification: grade I - full view of glottis  Placement verified by: capnometry   Measured from: lips  ETT to lips (cm): 23  Number of attempts at approach: 1  Number of other approaches attempted: 0

## 2024-03-06 NOTE — ANESTHESIA PREPROCEDURE EVALUATION
PRE-OP EVALUATION    Patient Name: Andrzej Morley    Admit Diagnosis: prostate stricture, bph and luts    Pre-op Diagnosis: prostate stricture, bph and luts    CYSTOSCOPY, URETHRAL DILATION,  TRANSURETHRAL RESECTION OF THE PROSTATE    Anesthesia Procedure: CYSTOSCOPY, URETHRAL DILATION,  TRANSURETHRAL RESECTION OF THE PROSTATE (Right)    Surgeon(s) and Role:     * Victor Manuel Aguero MD - Primary    Pre-op vitals reviewed.  Temp: 97.5 °F (36.4 °C)  Pulse: 60  Resp: 16  BP: 131/74  SpO2: 97 %  Body mass index is 28.29 kg/m².    Current medications reviewed.  Hospital Medications:   [MAR Hold] acetaminophen (Tylenol Extra Strength) tab 1,000 mg  1,000 mg Oral Once    lactated ringers infusion   Intravenous Continuous    ceFAZolin (Ancef) 2 g in 20mL IV syringe premix  2 g Intravenous Once       Outpatient Medications:     Medications Prior to Admission   Medication Sig Dispense Refill Last Dose    famotidine 20 MG Oral Tab Take 1 tablet (20 mg total) by mouth 2 (two) times daily.   3/6/2024 at 1030    polypropylene glycol- 0.4-0.3 % Ophthalmic Solution Place 1 drop into both eyes as needed.   3/5/2024    silodosin 8 MG Oral Cap Take 1 capsule (8 mg total) by mouth every evening.   3/5/2024 at 1830    amLODIPine 5 MG Oral Tab Take 1 tablet (5 mg total) by mouth at bedtime.   3/5/2024 at 1830    Apoaequorin (PREVAGEN OR) Take by mouth daily.   3/6/2024 at 1030    ATORVASTATIN 80 MG Oral Tab TAKE 1 TABLET BY MOUTH EVERY DAY IN THE EVENING 90 tablet 0 3/5/2024 at 1830    Simethicone 180 MG Oral Cap Take by mouth as needed.   Past Week    psyllium 28 % Oral Powd Pack Take 1 packet by mouth daily.   Past Week    Cholecalciferol (VITAMIN D) 1000 units Oral Tab Take 100 Units by mouth daily.   3/6/2024 at 1030    naproxen 500 MG Oral Tab Take 1 tablet (500 mg total) by mouth as needed.   [age] YEAR       Allergies: Patient has no known allergies.      Anesthesia Evaluation    Patient summary  reviewed.    Anesthetic Complications           GI/Hepatic/Renal      (+) GERD and well controlled                          Cardiovascular                  (+) hypertension                                     Endo/Other    Negative endo/other ROS.                              Pulmonary    Negative pulmonary ROS.                       Neuro/Psych    Negative neuro/psych ROS.                          Urinary retention        Past Surgical History:   Procedure Laterality Date    COLONOSCOPY N/A 1/14/2021    Procedure: COLONOSCOPY;  Surgeon: Michael Funes MD;  Location:  ENDOSCOPY    EXC SKIN BENIG 1.1-2CM TRUNK,ARM,LEG N/A 10/16/2014    Procedure: EXCISION OF SEBACEOUS CYST;  Surgeon: Issac Gonzalez MD;  Location: [facility]    HIP REPLACEMENT SURGERY      right hip -2004 - dr merlos    OTHER SURGICAL HISTORY      excision of michelle scys on back- Dr. [id number]    PATIENT DOCUMENTED NOT TO HAVE EXPERIENCED ANY OF THE FOLLOWING EVENTS N/A 10/16/2014    Procedure: EXCISION OF SEBACEOUS CYST;  Surgeon: Issac Gonzalez MD;  Location: [facility]    PATIENT WITHOUGH PREOPERATIVE ORDER FOR IV ANTIBIOTIC SURGICAL SITE INFECTION PROPHYLAXIS. N/A 10/16/2014    Procedure: EXCISION OF SEBACEOUS CYST;  Surgeon: Issac Gonzalez MD;  Location: [facility]    SPECIAL SERVICE OR REPORT  Aug 2006    Brachytherapy of the prostate    TONSILLECTOMY      TOTAL HIP REPLACEMENT Bilateral     TOTAL KNEE REPLACEMENT Left      Social History     Socioeconomic History    Marital status:    Tobacco Use    Smoking status: Former    Smokeless tobacco: Never    Tobacco comments:     quit cigarettes 2006   Vaping Use    Vaping Use: Never used   Substance and Sexual Activity    Alcohol use: Yes     Alcohol/week: 7.0 standard drinks of alcohol     Types: 7 Standard drinks or equivalent per week     Comment: SOCIAL.    Drug use: No     History   Drug Use No     Available pre-op labs reviewed.                Airway      Mallampati: II  Mouth opening: 3 FB  TM distance: 4 - 6 cm  Neck ROM: full Cardiovascular    Cardiovascular exam normal.         Dental    Dentition appears grossly intact         Pulmonary    Pulmonary exam normal.                 Other findings              ASA: 2   Plan: general  NPO status verified and patient meets guidelines.          Plan/risks discussed with: patient  Use of blood product(s) discussed with: patient    Consented to blood products.          Present on Admission:  **None**

## 2024-03-06 NOTE — DISCHARGE INSTRUCTIONS
POST URETHROTOMY  INSTRUCTIONS  Leave the compressive dressing in place for 2 days .   Will remove the stafford in office on Friday   and then you can take a shower  Bactrim antibiotics twice a day  when home x 1 week   Motrin  as needed every 4-6 hrs for pain     Use  the large night  bag when in house. Use the hook on the bag and place it in your pocket or hook to your belt.   Its OK to lay the night bag on the floor while you sleep.   Exchange the night bag to the smaller leg bag when you go out of the house.   NO strenuous activity,  heavy lifting ( > 10-15 lbs) or exercise for 10-14 days  However, don't be a prisoner of your house. Its a good idea to go for a walk and be out  of the house every day.        You received Norco 5mg/325mg at 7pm.

## 2024-03-06 NOTE — ANESTHESIA POSTPROCEDURE EVALUATION
[facility]    Andrzej Morley Patient Status:  Hospital Outpatient Surgery   Age/Gender 83 year old male MRN [id number]   Location [facility] SURGERY Attending Victor Manuel Aguero*   Hosp Day # 0 PCP Octavio Varela MD       Anesthesia Post-op Note    CYSTOSCOPY, URETHRAL DILATION,  TRANSURETHRAL RESECTION OF THE PROSTATE    Procedure Summary       Date: 03/06/24 Room / Location:  MAIN OR 02 / [facility] MAIN OR    Anesthesia Start: 1642 Anesthesia Stop: 1743    Procedure: CYSTOSCOPY, URETHRAL DILATION,  TRANSURETHRAL RESECTION OF THE PROSTATE (Right) Diagnosis: (prostate stricture, bph and luts)    Surgeons: Victor Manuel Aguero MD Anesthesiologist: Link Trinh DO    Anesthesia Type: general ASA Status: 2            Anesthesia Type: general    Vitals Value Taken Time   /75 03/06/24 1743   Temp 97.5 03/06/24 1743   Pulse 71 03/06/24 1743   Resp 16 03/06/24 1743   SpO2 96 03/06/24 1743       Patient Location: PACU    Anesthesia Type: general    Airway Patency: patent and extubated    Postop Pain Control: adequate    Mental Status: preanesthetic baseline    Nausea/Vomiting: none    Cardiopulmonary/Hydration status: stable euvolemic    Complications: no apparent anesthesia related complications    Postop vital signs: stable    Dental Exam: Unchanged from Preop    Patient to be discharged from PACU when criteria met.

## 2024-03-06 NOTE — OPERATIVE REPORT
Pre-Operative Diagnosis: prostate stricture, bph and luts     Post-Operative Diagnosis: prostate stricture, bph and luts      Procedure Performed:   CYSTOSCOPY, URETHRAL DILATION,  TRANSURETHRAL RESECTION OF THE PROSTATE    Surgeon(s) and Role:     * Victor Manuel Aguero MD - Primary    Assistant(s):        Surgical Findings: Stenosis of the prostate and Bladder neck      Specimen: None      Estimated Blood Loss: Blood Output: 20 mL (3/6/2024  5:30 PM)      Dictation Number:   Patient was well identified in the operating room table.  He was draped and prepped in usual sterile fashion lithotomy position.  All pressure points were padded.  Initially underwent a standard timeout procedure received 2 g of Ancef and Venodyne's within an hour of surgery.  Initially performed rigid cystoscopy which noted stricture of the mid prostate going back approximately presumably to the bladder neck.  Under direct vision we placed stiff and Glidewire into the bladder we then used Merida sounds dilating 14-24 Estonian without much incident.  Of note lumen was not very identifiable before dilation.  After dilation the lumen really popped open very nicely and it looked like the stenosis was mostly from mid prostate to the bladder neck.  Because of his history of brachy therapy we were hesitant to incise the bladder neck.  We then placed the continuous-flow resectoscope into the bladder and used the button to shave down the middle of the prostate a little bit on the left than the right side , as well as for good hemostasis.  The prostate was very short.  At the end of the case we placed an 18 Estonian Royal catheter with 45 cc in the balloon compressive dressing and the patient was then subsequently transferred to the recovery room in stable condition.    Victor Manuel Aguero MD  3/6/2024  5:51 PM

## 2024-03-06 NOTE — BRIEF OP NOTE
Pre-Operative Diagnosis: prostate stricture, bph and luts     Post-Operative Diagnosis: prostate stricture, bph and luts      Procedure Performed:   CYSTOSCOPY, URETHRAL DILATION,  TRANSURETHRAL RESECTION OF THE PROSTATE    Surgeon(s) and Role:     * Victor Manuel Aguero MD - Primary    Assistant(s):        Surgical Findings: Stenosis of the prostate and Bladder neck      Specimen: None      Estimated Blood Loss: Blood Output: 20 mL (3/6/2024  5:30 PM)      Dictation Number:  see op note     Victor Manuel Aguero MD  3/6/2024  5:51 PM

## 2024-03-06 NOTE — H&P
Andrzej Morley is a 83 year old male.    Ho CAP - Brachy TX 2007 Cesar to date     Here with gross heme and LUTS     C/o - weak flow  C/o - nocturia  C/o - occ gross hematuria    Subjective:     Chief Complaint: Procedure (cystoscopy)    Gross heme     History/Other:     Current Outpatient Medications   Medication Sig Dispense Refill   AMLODIPINE 5 MG Oral Tab TAKE 1 TABLET BY MOUTH EVERYDAY AT BEDTIME 90 tablet 0   LISINOPRIL 40 MG Oral Tab TAKE 1 TABLET BY MOUTH EVERY DAY 90 tablet 1   acetaZOLAMIDE 125 MG Oral Tab 125 mg twice daily; start 24 hrs prior to ascent, may be discontinued after staying at the same elevation for 2 to 4 days 20 tablet 0   atorvastatin 80 MG Oral Tab TAKE 1 TABLET BY MOUTH EVERY EVENING 90 tablet 3   silodosin (RAPAFLO) 8 MG Oral Cap Take 1 capsule (8 mg total) by mouth daily with dinner. 30 capsule 11   cyclobenzaprine 5 MG Oral Tab Take 1 tablet (5 mg total) by mouth 3 (three) times daily as needed for Muscle spasms. 20 tablet 0   famotidine 20 MG Oral Tab Take 1 tablet (20 mg total) by mouth 2 (two) times daily. 60 tablet 0   naproxen 500 MG Oral Tab Take 500 mg by mouth as needed.   diphenhydrAMINE HCl 25 MG Oral Tab Take 25 mg by mouth every 6 (six) hours as needed for Itching.   Simethicone 180 MG Oral Cap Take by mouth as needed.   psyllium 28 % Oral Powd Pack Take 1 packet by mouth 2 (two) times daily.   Cholecalciferol (VITAMIN D) 1000 units Oral Tab Take 100 Units by mouth daily.     Past Medical History:   Diagnosis Date   Acid reflux   Actinic keratosis - OSWALD Lee 04/07/2014   Colon cancer screening [6/2/13] recheck 10 years [At that time, is risk > benefit ?] - JUANJOSE Zee 05/17/2017   Diverticulosis of large intestine 2010   prior doctor told patient, walls thin, possibility of rupture; careful having a colonoscopy   H/O [ ~ ' 68 ] Rt sialadenitis [stone extraction] 01/16/2013   Hearing impairment   Hypercholesterolemia   Hypertension   Non-recurrent bilateral inguinal hernia  without obstruction or gangrene 04/07/2014   Osteoarthritis of hip   Prostate cancer (HCC) 2006   Trey 3+3. Pretreatment psa 4.4     Past Surgical History:   Procedure Laterality Date   BRACHYTHERAPY (SEED IMPLANT) ([facility]) 08/2006   Prostate CA   COLONOSCOPY N/A 01/14/2021   Procedure: COLONOSCOPY; Surgeon: Michael Funes MD; Location:  ENDOSCOPY   CYSTOURETHROSCOPY 08/16/2022   Dr. Gilbert   EXC SKIN BENIG 1.1-2CM TRUNK,ARM,LEG N/A 10/16/2014   Procedure: EXCISION OF SEBACEOUS CYST; Surgeon: Issac Gonzalez MD; Location: [facility]   HIP REPLACEMENT SURGERY Right 2004   Dr Knight   OTHER SURGICAL HISTORY   excision of michelle scys on back- Dr. [id number]   OTHER SURGICAL HISTORY 01/12/2024   cystoscopy with Dr. Aguero   PATIENT DOCUMENTED NOT TO HAVE EXPERIENCED ANY OF THE FOLLOWING EVENTS N/A 10/16/2014   Procedure: EXCISION OF SEBACEOUS CYST; Surgeon: Issac Gonzalez MD; Location: [facility]   PATIENT WITHOUGH PREOPERATIVE ORDER FOR IV ANTIBIOTIC SURGICAL SITE INFECTION PROPHYLAXIS. N/A 10/16/2014   Procedure: EXCISION OF SEBACEOUS CYST; Surgeon: Issac Gonzalez MD; Location: [facility]   REMOVAL OF SPERM DUCT(S) 1975   TONSILLECTOMY   TOTAL HIP REPLACEMENT Bilateral   TOTAL KNEE REPLACEMENT Left     Social History  Tobacco Use  Smoking status: Former  Smokeless tobacco: Never  Tobacco comments: quit cigarettes 2006  Vaping Use  Vaping Use: Never used  Alcohol use: Yes  Comment: SOCIAL.  Drug use: No      REVIEW OF SYSTEMS:     GENERAL HEALTH: feels well otherwise  SKIN: denies any unusual skin lesions or rashes  RESPIRATORY: denies shortness of breath with exertion  CARDIOVASCULAR: denies chest pain on exertion  GI: denies abdominal pain and denies heartburn  : See HPI  NEURO: denies headaches    Objective:     GENERAL: well developed, well nourished, in no apparent distress  HEENT: atraumatic, normocephalic, ears and throat are clear  NECK: supple  LUNGS: normal respiratory motion without  distress  CARDIO:NA  GI: NA  :NL Penis: circumcised. Testicles: No masses or tenderness. Prostate:DEF Is soft, smooth, no nodules.   MUSCULOSKELETAL: FROM, Normal gait  NEURO: Alert   EXTREMITIES: No edema or cyanosis     Procedure : Cystoscopy   Anesthesia: 2% Intraurethral Lidocaine Jelly  Complications: None  Andrzej was placed in the supine position, and prepped and draped in the usual standard sterile fashion. 2% lidocaine jelly was inserted intraurethrally via the meatus. The meatus was then intubated with a flexible cystoscope, and visual cystourethroscopic examination was initiated under sterile water irrigation. The following findings were noted:  Findings:  Meatus was normal.  Penile urethra was normal.  Bulbar urethra was normal.  Membranous urethra was normal. Seemed to open and close on command   Prostatic urethra was stenotic -- 1-2 cm proximal of the ES - seems to be in the middle of the prostate     Stenosis 8 FR -- could not even see a lumen   When the procedure was completed, the cystoscope was removed. The patient tolerated the procedure well.     Assessment & Plan:     CAP SP Brach TX -- NICOLE to date 8-2023 PSA < 0.014   Gross Hematuria - assume form the RT   CT Urogram - essen negative   Cytology - atypical  3. Mid Prostatic Stricture from RT   4. BPH and LUTS   Will plan on doing a urethral dilation followed by a limited TUR -- will need to prserve the BN -- don't want to risk SUDHAKAR   Will leave in a staff[place] a couple of Days   Will inject Kenalog as well   50/50 success    Informed Consent Endo Case   All risks, benefits and alternatives to surgery were discussed with the patient in detail. Typical risks of gross hematuria, UTI, urosepsis, need for hospitalization, surgical failure ( and thus the need for further surgery) were discussed with patient in detail. In addition, further very rare inherent risks specific to lithotomy positioning, such as neuropathy, DVT, PE and rhabdomyolysis were  discussed. Complications such as urethral, ureteral, or bladder injury and thus subsequent/ delayed stricture or fistula formation were discussed as a rare , but inherent risk of endoscopic surgery. Differing possible surgical options and approaches were also discussed. All patient questions were answered. After weighing all the risks, benefits and alternatives to surgery, the patient wishes to proceed with surgery as discussed. Will schedule accordingly.     Diagnoses and all orders for this visit:

## 2024-03-09 ENCOUNTER — HOSPITAL ENCOUNTER (EMERGENCY)
Facility: HOSPITAL | Age: 84
Discharge: HOME | End: 2024-03-09
Attending: EMERGENCY MEDICINE
Payer: MEDICARE

## 2024-03-09 VITALS
HEART RATE: 80 BPM | SYSTOLIC BLOOD PRESSURE: 168 MMHG | OXYGEN SATURATION: 95 % | TEMPERATURE: 96 F | DIASTOLIC BLOOD PRESSURE: 83 MMHG | RESPIRATION RATE: 16 BRPM

## 2024-03-09 DIAGNOSIS — R33.9 URINARY RETENTION: Primary | ICD-10-CM

## 2024-03-09 LAB
BILIRUB UR QL STRIP.AUTO: NEGATIVE
CLARITY UR REFRACT.AUTO: CLEAR
GLUCOSE UR STRIP.AUTO-MCNC: NORMAL MG/DL
KETONES UR STRIP.AUTO-MCNC: NEGATIVE MG/DL
LEUKOCYTE ESTERASE UR QL STRIP.AUTO: 25
NITRITE UR QL STRIP.AUTO: NEGATIVE
PH UR STRIP.AUTO: 6.5 [PH] (ref 5–8)
PROT UR STRIP.AUTO-MCNC: 20 MG/DL
RBC #/AREA URNS AUTO: >10 /HPF
SP GR UR STRIP.AUTO: 1.02 (ref 1–1.03)
UROBILINOGEN UR STRIP.AUTO-MCNC: NORMAL MG/DL

## 2024-03-09 PROCEDURE — 99283 EMERGENCY DEPT VISIT LOW MDM: CPT

## 2024-03-09 PROCEDURE — 81001 URINALYSIS AUTO W/SCOPE: CPT | Performed by: EMERGENCY MEDICINE

## 2024-03-09 PROCEDURE — 87086 URINE CULTURE/COLONY COUNT: CPT | Performed by: EMERGENCY MEDICINE

## 2024-03-09 RX ORDER — LIDOCAINE HYDROCHLORIDE 20 MG/ML
JELLY TOPICAL
Status: COMPLETED
Start: 2024-03-09 | End: 2024-03-09

## 2024-03-09 RX ORDER — LIDOCAINE HYDROCHLORIDE 20 MG/ML
10 JELLY TOPICAL ONCE
Status: COMPLETED | OUTPATIENT
Start: 2024-03-09 | End: 2024-03-09

## 2024-03-09 NOTE — ED PROVIDER NOTES
Patient Seen in: [facility] Emergency Department      History     Chief Complaint   Patient presents with    Urinary Symptoms     Stated Complaint: Urinary surgery, catheter removed Friday, Cannot urinate, constipated    Subjective:   HPI    83-year-old male presents to the emergency department with complaints of inability to urinate.  Patient had undergone a TURP with urethral dilatation secondary to stricture on Wednesday.  He states he had the Royal catheter removed yesterday.  He was able to initially go a little bit but has not been able to go since.  He is having a lot of pressure and discomfort.  He states he almost feels that he is constipated as well.  No fevers or chills.  No vomiting.  No other acute complaints    Objective:   Past Medical History:   Diagnosis Date    Actinic keratosis - OSWALD eLe 04/07/2014    Cancer (HCC)     prostate    Colon cancer screening [6/2/13] recheck 10 years [At that time, is risk > benefit ?] -  JUANJOSE Zee 05/17/2017    Diverticulosis of large intestine 2010    prior doctor told patient, walls thin, possibility of rupture; careful having a colonoscopy    Esophageal reflux     H/O [ ~ ' 68 ] Rt sialadenitis [stone extraction] 01/16/2013    Hearing impairment     High blood pressure     High cholesterol     Hypercholesterolemia     Osteoarthritis     Osteoarthritis of hip     Other and unspecified personal history of malignant neoplasm     Prostate cancer.  Trey 3,3.  Pretreatment psa 4.4    Reflux     S/P [ ~ ' 75 ] vasectomy  01/16/2013    Visual impairment               Past Surgical History:   Procedure Laterality Date    COLONOSCOPY N/A 1/14/2021    Procedure: COLONOSCOPY;  Surgeon: Michael Funes MD;  Location:  ENDOSCOPY    EXC SKIN BENIG 1.1-2CM TRUNK,ARM,LEG N/A 10/16/2014    Procedure: EXCISION OF SEBACEOUS CYST;  Surgeon: Issac Gonzalez MD;  Location: [facility]    HIP REPLACEMENT SURGERY      right hip -2004 - dr merlos    OTHER SURGICAL HISTORY       excision of michelle scys on back- Dr. [id number]    PATIENT DOCUMENTED NOT TO HAVE EXPERIENCED ANY OF THE FOLLOWING EVENTS N/A 10/16/2014    Procedure: EXCISION OF SEBACEOUS CYST;  Surgeon: Issac Gonzalez MD;  Location: [facility]    PATIENT WITHOUGH PREOPERATIVE ORDER FOR IV ANTIBIOTIC SURGICAL SITE INFECTION PROPHYLAXIS. N/A 10/16/2014    Procedure: EXCISION OF SEBACEOUS CYST;  Surgeon: Issac Gonzalez MD;  Location: [facility]    SPECIAL SERVICE OR REPORT  Aug 2006    Brachytherapy of the prostate    TONSILLECTOMY      TOTAL HIP REPLACEMENT Bilateral     TOTAL KNEE REPLACEMENT Left                 Social History     Socioeconomic History    Marital status:    Tobacco Use    Smoking status: Former    Smokeless tobacco: Never    Tobacco comments:     quit cigarettes 2006   Vaping Use    Vaping Use: Never used   Substance and Sexual Activity    Alcohol use: Yes     Alcohol/week: 7.0 standard drinks of alcohol     Types: 7 Standard drinks or equivalent per week     Comment: SOCIAL.    Drug use: No   Social History Narrative     - 1961. Wife - jan - is healthy - atrial fibrillation and patellar fracture. 2d[facility] 1d[facility] 3 sons- 18,20, 22 ; 1d- 47 [place], [place]  and stepson -23. Retired - 1997 [employer]- [employer]. Lives in [place] in [place]. Exercise - leg lifts. 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               Review of Systems   All other systems reviewed and are negative.      Positive for stated complaint: Urinary surgery, catheter removed Friday, Cannot urinate, constipated  Other systems are as noted in HPI.  Constitutional and vital signs reviewed.      All other systems reviewed and negative except as noted above.    Physical Exam     ED Triage Vitals   BP 03/09/24 0732 (!) 168/83   Pulse 03/09/24 0731 89   Resp 03/09/24 0731 16   Temp 03/09/24 0731 (!) 96 °F (35.6 °C)   Temp src 03/09/24 0731 Temporal   SpO2 03/09/24 0731 95 %    O2 Device 03/09/24 0731 None (Room air)       Current:BP (!) 168/83   Pulse 89   Temp (!) 96 °F (35.6 °C) (Temporal)   Resp 16   SpO2 95%         Physical Exam  Vitals and nursing note reviewed. Chaperone present: Nurse Lise in room.   Constitutional:       General: He is not in acute distress.     Appearance: Normal appearance. He is well-developed.   HENT:      Head: Normocephalic and atraumatic.   Cardiovascular:      Rate and Rhythm: Normal rate and regular rhythm.      Pulses: Normal pulses.      Heart sounds: Normal heart sounds.   Pulmonary:      Effort: Pulmonary effort is normal.      Breath sounds: Normal breath sounds. No stridor.   Abdominal:      General: Bowel sounds are normal.      Palpations: Abdomen is soft.      Tenderness: There is abdominal tenderness.      Comments: Suprapubic tenderness and fullness   Genitourinary:     Penis: Normal.    Musculoskeletal:         General: Normal range of motion.      Cervical back: Normal range of motion and neck supple.   Lymphadenopathy:      Cervical: No cervical adenopathy.   Skin:     General: Skin is warm and dry.   Neurological:      General: No focal deficit present.      Mental Status: He is alert and oriented to person, place, and time.              ED Course     Labs Reviewed   URINALYSIS, ROUTINE   URINE CULTURE, ROUTINE                      MDM      Patient's history and exam consistent with acute urinary retention.  I reviewed his previous records to confirm his surgical procedure.  He had a Uro-Jet and had a Royal catheter placed.  Patient had 900 cc of urine out and he felt immediate relief.  He was reevaluated and has no abdominal discomfort.  He has not had any fevers or any signs of infection.  We did send the urine for culture.  I spoke with urology.  Will keep the Royal catheter in and they will follow-up with them early next week.  Patient was shown how to use a leg bag.  Patient was discharged in good condition                                    Medical Decision Making      Disposition and Plan     Clinical Impression:  1. Urinary retention         Disposition:  Discharge  3/9/2024  8:58 am    Follow-up:  Victor Manuel Aguero MD  [address] [place] [id number]  [phone]    Schedule an appointment as soon as possible for a visit            Medications Prescribed:  Current Discharge Medication List

## 2024-03-09 NOTE — ED INITIAL ASSESSMENT (HPI)
Patient had prostate surgery on Wednesday, had urinary catheter meagan daniel, then removed yesterday afternoon. Patient has been unable to urinate since yesterday around 1330. Patient states c/o penile and bladder pain rating 8 out of 10 on pain scale.

## 2024-03-22 ENCOUNTER — HOSPITAL ENCOUNTER (EMERGENCY)
Facility: HOSPITAL | Age: 84
Discharge: HOME | End: 2024-03-22
Attending: EMERGENCY MEDICINE
Payer: MEDICARE

## 2024-03-22 VITALS
SYSTOLIC BLOOD PRESSURE: 169 MMHG | HEART RATE: 70 BPM | WEIGHT: 198.44 LBS | RESPIRATION RATE: 20 BRPM | DIASTOLIC BLOOD PRESSURE: 86 MMHG | BODY MASS INDEX: 28 KG/M2 | OXYGEN SATURATION: 97 % | TEMPERATURE: 98 F

## 2024-03-22 DIAGNOSIS — R33.9 URINARY RETENTION: Primary | ICD-10-CM

## 2024-03-22 LAB
ALBUMIN SERPL-MCNC: 4 G/DL (ref 3.4–5)
ALBUMIN/GLOB SERPL: 1.1 {RATIO} (ref 1–2)
ALP LIVER SERPL-CCNC: 105 U/L
ALT SERPL-CCNC: 30 U/L
ANION GAP SERPL CALC-SCNC: 5 MMOL/L (ref 0–18)
AST SERPL-CCNC: 20 U/L (ref 15–37)
BASOPHILS # BLD AUTO: 0.05 X10(3) UL (ref 0–0.2)
BASOPHILS NFR BLD AUTO: 0.7 %
BILIRUB SERPL-MCNC: 0.5 MG/DL (ref 0.1–2)
BILIRUB UR QL STRIP.AUTO: NEGATIVE
BUN BLD-MCNC: 18 MG/DL (ref 9–23)
CALCIUM BLD-MCNC: 9.4 MG/DL (ref 8.5–10.1)
CHLORIDE SERPL-SCNC: 106 MMOL/L (ref 98–112)
CLARITY UR REFRACT.AUTO: CLEAR
CO2 SERPL-SCNC: 31 MMOL/L (ref 21–32)
COLOR UR AUTO: YELLOW
CREAT BLD-MCNC: 1.08 MG/DL
EGFRCR SERPLBLD CKD-EPI 2021: 67 ML/MIN/1.73M2 (ref 60–?)
EOSINOPHIL # BLD AUTO: 0.2 X10(3) UL (ref 0–0.7)
EOSINOPHIL NFR BLD AUTO: 2.6 %
ERYTHROCYTE [DISTWIDTH] IN BLOOD BY AUTOMATED COUNT: 12.7 %
GLOBULIN PLAS-MCNC: 3.6 G/DL (ref 2.8–4.4)
GLUCOSE BLD-MCNC: 109 MG/DL (ref 70–99)
GLUCOSE UR STRIP.AUTO-MCNC: NORMAL MG/DL
HCT VFR BLD AUTO: 43.5 %
HGB BLD-MCNC: 14 G/DL
HYALINE CASTS #/AREA URNS AUTO: PRESENT /LPF
IMM GRANULOCYTES # BLD AUTO: 0.03 X10(3) UL (ref 0–1)
IMM GRANULOCYTES NFR BLD: 0.4 %
KETONES UR STRIP.AUTO-MCNC: NEGATIVE MG/DL
LEUKOCYTE ESTERASE UR QL STRIP.AUTO: 25
LYMPHOCYTES # BLD AUTO: 1.96 X10(3) UL (ref 1–4)
LYMPHOCYTES NFR BLD AUTO: 25.6 %
MCH RBC QN AUTO: 31.8 PG (ref 26–34)
MCHC RBC AUTO-ENTMCNC: 32.2 G/DL (ref 31–37)
MCV RBC AUTO: 98.9 FL
MONOCYTES # BLD AUTO: 0.73 X10(3) UL (ref 0.1–1)
MONOCYTES NFR BLD AUTO: 9.5 %
NEUTROPHILS # BLD AUTO: 4.68 X10 (3) UL (ref 1.5–7.7)
NEUTROPHILS # BLD AUTO: 4.68 X10(3) UL (ref 1.5–7.7)
NEUTROPHILS NFR BLD AUTO: 61.2 %
NITRITE UR QL STRIP.AUTO: NEGATIVE
OSMOLALITY SERPL CALC.SUM OF ELEC: 296 MOSM/KG (ref 275–295)
PH UR STRIP.AUTO: 5.5 [PH] (ref 5–8)
PLATELET # BLD AUTO: 252 10(3)UL (ref 150–450)
POTASSIUM SERPL-SCNC: 3.7 MMOL/L (ref 3.5–5.1)
PROT SERPL-MCNC: 7.6 G/DL (ref 6.4–8.2)
PROT UR STRIP.AUTO-MCNC: 30 MG/DL
RBC # BLD AUTO: 4.4 X10(6)UL
RBC #/AREA URNS AUTO: >10 /HPF
SODIUM SERPL-SCNC: 142 MMOL/L (ref 136–145)
SP GR UR STRIP.AUTO: 1.03 (ref 1–1.03)
UROBILINOGEN UR STRIP.AUTO-MCNC: NORMAL MG/DL
WBC # BLD AUTO: 7.7 X10(3) UL (ref 4–11)

## 2024-03-22 PROCEDURE — 99284 EMERGENCY DEPT VISIT MOD MDM: CPT

## 2024-03-22 PROCEDURE — 85025 COMPLETE CBC W/AUTO DIFF WBC: CPT | Performed by: EMERGENCY MEDICINE

## 2024-03-22 PROCEDURE — 81001 URINALYSIS AUTO W/SCOPE: CPT | Performed by: EMERGENCY MEDICINE

## 2024-03-22 PROCEDURE — 51702 INSERT TEMP BLADDER CATH: CPT

## 2024-03-22 PROCEDURE — 87086 URINE CULTURE/COLONY COUNT: CPT | Performed by: EMERGENCY MEDICINE

## 2024-03-22 PROCEDURE — 36415 COLL VENOUS BLD VENIPUNCTURE: CPT

## 2024-03-22 PROCEDURE — 85025 COMPLETE CBC W/AUTO DIFF WBC: CPT

## 2024-03-22 PROCEDURE — 80053 COMPREHEN METABOLIC PANEL: CPT

## 2024-03-22 PROCEDURE — 99283 EMERGENCY DEPT VISIT LOW MDM: CPT

## 2024-03-22 PROCEDURE — 80053 COMPREHEN METABOLIC PANEL: CPT | Performed by: EMERGENCY MEDICINE

## 2024-03-22 PROCEDURE — 81001 URINALYSIS AUTO W/SCOPE: CPT

## 2024-03-23 NOTE — ED PROVIDER NOTES
Patient Seen in: [facility] Emergency Department      History     Chief Complaint   Patient presents with    Urinary Symptoms     Stated Complaint: urinary retention, post op    Subjective:   HPI    83-year-old male past medical history of urinary retention presents ED with complaints of urinary retention.  Patient reports that he had Royal catheter removed just last week 3/15/2024.  Reports that he has been having intermittent pains in his \"prostate area\" since yesterday night.  Reports that he had similar symptoms last time he had urinary retention.  Denies nausea vomiting constipation diarrhea fever chills back pain.  States that he has not been able to urinate Royal has been voiding very little and complain of urinary frequency and urgency.    Objective:   Past Medical History:   Diagnosis Date    Actinic keratosis - OSWALD Lee 04/07/2014    Cancer (HCC)     prostate    Colon cancer screening [6/2/13] recheck 10 years [At that time, is risk > benefit ?] -  JUANJOSE Zee 05/17/2017    Diverticulosis of large intestine 2010    prior doctor told patient, walls thin, possibility of rupture; careful having a colonoscopy    Esophageal reflux     H/O [ ~ ' 66 ] Rt sialadenitis [stone extraction] 01/16/2013    Hearing impairment     High blood pressure     High cholesterol     Hypercholesterolemia     Osteoarthritis     Osteoarthritis of hip     Other and unspecified personal history of malignant neoplasm     Prostate cancer.  [place] 3,3.  Pretreatment psa 4.4    Reflux     S/P [ ~ ' 75 ] vasectomy  01/16/2013    Visual impairment               Past Surgical History:   Procedure Laterality Date    COLONOSCOPY N/A 1/14/2021    Procedure: COLONOSCOPY;  Surgeon: Michael Funes MD;  Location:  ENDOSCOPY    EXC SKIN BENIG 1.1-2CM TRUNK,ARM,LEG N/A 10/16/2014    Procedure: EXCISION OF SEBACEOUS CYST;  Surgeon: Issac Gonzalez MD;  Location: [facility]    HIP REPLACEMENT SURGERY      right hip -2004 - dr merlos     OTHER SURGICAL HISTORY      excision of michelle scys on back- Dr. [id number]    PATIENT DOCUMENTED NOT TO HAVE EXPERIENCED ANY OF THE FOLLOWING EVENTS N/A 10/16/2014    Procedure: EXCISION OF SEBACEOUS CYST;  Surgeon: Issac Gonzalez MD;  Location: [facility]    PATIENT WITHOUGH PREOPERATIVE ORDER FOR IV ANTIBIOTIC SURGICAL SITE INFECTION PROPHYLAXIS. N/A 10/16/2014    Procedure: EXCISION OF SEBACEOUS CYST;  Surgeon: Issac Gonzalez MD;  Location: [facility]    SPECIAL SERVICE OR REPORT  Aug 2006    Brachytherapy of the prostate    TONSILLECTOMY      TOTAL HIP REPLACEMENT Bilateral     TOTAL KNEE REPLACEMENT Left                 Social History     Socioeconomic History    Marital status:    Tobacco Use    Smoking status: Former    Smokeless tobacco: Never    Tobacco comments:     quit cigarettes 2006   Vaping Use    Vaping Use: Never used   Substance and Sexual Activity    Alcohol use: Yes     Alcohol/week: 7.0 standard drinks of alcohol     Types: 7 Standard drinks or equivalent per week     Comment: SOCIAL.    Drug use: No   Social History Narrative     - 1961. Wife - judit - is healthy - atrial fibrillation and patellar fracture. 2d- 1d- 49 [facility]- 3 sons- 18,20, 22 ; 1d- 47 [place], [place]  and stepson -23. 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 Lives in [place] in [place]. Exercise - leg lifts. 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               Review of Systems    Positive for stated complaint: urinary retention, post op  Other systems are as noted in HPI.  Constitutional and vital signs reviewed.      All other systems reviewed and negative except as noted above.    Physical Exam     ED Triage Vitals [03/22/24 2131]   /75   Pulse 82   Resp 16   Temp 97.8 °F (36.6 °C)   Temp src Temporal   SpO2 98 %   O2 Device None (Room air)       Current:BP (!) 152/96   Pulse 64   Temp 97.8 °F (36.6 °C) (Temporal)   Resp 17   Wt 90 kg   SpO2 99%    BMI 28.07 kg/m²         Physical Exam  Vitals and nursing note reviewed.   Constitutional:       Appearance: He is well-developed.   HENT:      Head: Normocephalic and atraumatic.   Eyes:      Pupils: Pupils are equal, round, and reactive to light.   Cardiovascular:      Rate and Rhythm: Normal rate and regular rhythm.   Pulmonary:      Effort: Pulmonary effort is normal.      Breath sounds: Normal breath sounds.   Abdominal:      General: Bowel sounds are normal.      Palpations: Abdomen is soft.   Musculoskeletal:         General: No deformity.      Cervical back: Normal range of motion and neck supple.   Skin:     General: Skin is warm and dry.      Capillary Refill: Capillary refill takes less than 2 seconds.   Neurological:      Mental Status: He is alert and oriented to person, place, and time.               ED Course     Labs Reviewed   COMP METABOLIC PANEL (14) - Abnormal; Notable for the following components:       Result Value    Glucose 109 (*)     Calculated Osmolality 296 (*)     All other components within normal limits   URINALYSIS WITH CULTURE REFLEX - Abnormal; Notable for the following components:    Blood Urine 2+ (*)     Protein Urine 30 (*)     Leukocyte Esterase Urine 25 (*)     WBC Urine 21-50 (*)     RBC Urine >10 (*)     Squamous Epi. Cells Few (*)     Hyaline Casts Present (*)     All other components within normal limits   CBC WITH DIFFERENTIAL WITH PLATELET    Narrative:     The following orders were created for panel order CBC With Differential With Platelet.  Procedure                               Abnormality         Status                     ---------                               -----------         ------                     CBC W/ DIFFERENTIAL[[id number]]                              Final result                 Please view results for these tests on the individual orders.   RAINBOW DRAW GOLD   RAINBOW DRAW BLUE   URINE CULTURE, ROUTINE   CBC W/ DIFFERENTIAL                           MDM      This is an 83-year-old male presents ED with complaints of urinary retention.  Mildly hypertensive on arrival appears nontoxic examination abdomen soft nontender rebound guarding or rigidity.  CBC and electrolytes obtained gross within normal limits urinalysis with elevated WBC count however no bacteria will send for culture for further evaluation.  Royal catheter placed greater than 200 cc obtained of dark appearing urine.  No clots seen.  Will send home with a leg bag with Royal catheter in place.  Close follow-up with urology recommended strict return precautions instructed.                                   Medical Decision Making      Disposition and Plan     Clinical Impression:  1. Urinary retention         Disposition:  Discharge  3/22/2024 11:34 pm    Follow-up:  Victor Manuel Aguero MD  [address] [address] [phone]    Call in 1 day(s)            Medications Prescribed:  Current Discharge Medication List

## 2024-03-23 NOTE — DISCHARGE INSTRUCTIONS
Please follow up with urology. Call in the morning to schedule an appointment for next week. Drink lots of fluids. Return to the ER if symptoms worsen or progress.

## 2024-03-28 ENCOUNTER — OFFICE VISIT (OUTPATIENT)
Dept: PODIATRY CLINIC | Facility: CLINIC | Age: 84
End: 2024-03-28
Payer: MEDICARE

## 2024-03-28 DIAGNOSIS — M79.675 TOE PAIN, BILATERAL: ICD-10-CM

## 2024-03-28 DIAGNOSIS — M21.622 TAILOR'S BUNION OF BOTH FEET: ICD-10-CM

## 2024-03-28 DIAGNOSIS — B35.1 ONYCHOMYCOSIS: Primary | ICD-10-CM

## 2024-03-28 DIAGNOSIS — L60.0 ONYCHOCRYPTOSIS: ICD-10-CM

## 2024-03-28 DIAGNOSIS — M21.621 TAILOR'S BUNION OF BOTH FEET: ICD-10-CM

## 2024-03-28 DIAGNOSIS — M79.674 TOE PAIN, BILATERAL: ICD-10-CM

## 2024-03-28 DIAGNOSIS — M54.50 LEFT-SIDED LOW BACK PAIN WITHOUT SCIATICA, UNSPECIFIED CHRONICITY: ICD-10-CM

## 2024-03-28 DIAGNOSIS — M77.42 METATARSALGIA OF BOTH FEET: ICD-10-CM

## 2024-03-28 DIAGNOSIS — M77.41 METATARSALGIA OF BOTH FEET: ICD-10-CM

## 2024-03-28 DIAGNOSIS — L84 HELOMA MOLLE: ICD-10-CM

## 2024-03-28 DIAGNOSIS — M20.11 VALGUS DEFORMITY OF BOTH GREAT TOES: ICD-10-CM

## 2024-03-28 DIAGNOSIS — M20.12 VALGUS DEFORMITY OF BOTH GREAT TOES: ICD-10-CM

## 2024-03-28 PROCEDURE — 99213 OFFICE O/P EST LOW 20 MIN: CPT | Performed by: STUDENT IN AN ORGANIZED HEALTH CARE EDUCATION/TRAINING PROGRAM

## 2024-03-28 NOTE — PROGRESS NOTES
[facility] Podiatry  Progress Note    Andrzej Morley is a 83 year old male.   Chief Complaint   Patient presents with    Toenail Care     Nail care and foot check- callus care          HPI:     Patient is a pleasant 83-year-old male who returns to clinic for evaluation of elongated nails and thickened calluses he has difficulty trimming his own.  He gets recurrent callus between his left fourth and fifth digits that is doing better since he has been using padding to offload.  He has been managing this fairly well with silicone toe pad.   He denies any open sores to his feet or other concerns.  Past medical history, medications, and allergies reviewed.      Allergies: Patient has no known allergies.   Current Outpatient Medications   Medication Sig Dispense Refill    famotidine 20 MG Oral Tab Take 1 tablet (20 mg total) by mouth 2 (two) times daily.      polypropylene glycol- 0.4-0.3 % Ophthalmic Solution Place 1 drop into both eyes as needed.      silodosin 8 MG Oral Cap Take 1 capsule (8 mg total) by mouth every evening.      amLODIPine 5 MG Oral Tab Take 1 tablet (5 mg total) by mouth at bedtime.      Apoaequorin (PREVAGEN OR) Take by mouth daily.      ATORVASTATIN 80 MG Oral Tab TAKE 1 TABLET BY MOUTH EVERY DAY IN THE EVENING 90 tablet 0    naproxen 500 MG Oral Tab Take 1 tablet (500 mg total) by mouth as needed.      Simethicone 180 MG Oral Cap Take by mouth as needed.      psyllium 28 % Oral Powd Pack Take 1 packet by mouth daily.      Cholecalciferol (VITAMIN D) 1000 units Oral Tab Take 100 Units by mouth daily.        Past Medical History:   Diagnosis Date    Actinic keratosis - OSWALD Lee 04/07/2014    Cancer (HCC)     prostate    Colon cancer screening [6/2/13] recheck 10 years [At that time, is risk > benefit ?] -  JUANJOSE Zee 05/17/2017    Diverticulosis of large intestine 2010    prior doctor told patient, walls thin, possibility of rupture; careful having a colonoscopy    Esophageal reflux      H/O [ ~ ' 66 ] Rt sialadenitis [stone extraction] 01/16/2013    Hearing impairment     High blood pressure     High cholesterol     Hypercholesterolemia     Osteoarthritis     Osteoarthritis of hip     Other and unspecified personal history of malignant neoplasm     Prostate cancer.  Trey 3,3.  Pretreatment psa 4.4    Reflux     S/P [ ~ ' 75 ] vasectomy  01/16/2013    Visual impairment       Past Surgical History:   Procedure Laterality Date    COLONOSCOPY N/A 1/14/2021    Procedure: COLONOSCOPY;  Surgeon: Michael Funes MD;  Location:  ENDOSCOPY    EXC SKIN BENIG 1.1-2CM TRUNK,ARM,LEG N/A 10/16/2014    Procedure: EXCISION OF SEBACEOUS CYST;  Surgeon: Issac Gonzalez MD;  Location: [facility]    HIP REPLACEMENT SURGERY      right hip -2004 - dr merlos    OTHER SURGICAL HISTORY      excision of michelle scys on back- Dr. [id number]    PATIENT DOCUMENTED NOT TO HAVE EXPERIENCED ANY OF THE FOLLOWING EVENTS N/A 10/16/2014    Procedure: EXCISION OF SEBACEOUS CYST;  Surgeon: Issac Gonzalez MD;  Location: [facility]    PATIENT WITHOUGH PREOPERATIVE ORDER FOR IV ANTIBIOTIC SURGICAL SITE INFECTION PROPHYLAXIS. N/A 10/16/2014    Procedure: EXCISION OF SEBACEOUS CYST;  Surgeon: Issac Gonzalez MD;  Location: [facility]    SPECIAL SERVICE OR REPORT  Aug 2006    Brachytherapy of the prostate    TONSILLECTOMY      TOTAL HIP REPLACEMENT Bilateral     TOTAL KNEE REPLACEMENT Left       Family History   Problem Relation Age of Onset    Pulmonary Disease Mother         copd    Heart Disease Sister         atrial fibrillation    Heart Attack Father       Social History     Socioeconomic History    Marital status:    Tobacco Use    Smoking status: Former    Smokeless tobacco: Never    Tobacco comments:     quit cigarettes 2006   Vaping Use    Vaping Use: Never used   Substance and Sexual Activity    Alcohol use: Yes     Alcohol/week: 7.0 standard drinks of alcohol     Types: 7 Standard drinks or equivalent  per week     Comment: SOCIAL.    Drug use: No           REVIEW OF SYSTEMS:     Today reviewed systems as documented below  GENERAL HEALTH: feels well otherwise  SKIN: denies any unusual skin lesions or rashes  RESPIRATORY: denies shortness of breath with exertion  CARDIOVASCULAR: denies chest pain on exertion  GI: denies abdominal pain and denies heartburn  NEURO: denies headaches  MUSCULO: History of arthritis      EXAM:   There were no vitals taken for this visit.  GENERAL: well developed, well nourished, in no apparent distress  EXTREMITIES:   1. Integument: Normal skin temperature and turgor.  Nails x10 are elongated, thickened, dystrophic, subungual debris.  Hallux nails are incurvated bilaterally.   Very minimal hyperkeratotic lesion noted between fourth and fifth digits of left foot consistent with heloma molle.   No open lesions or acute signs of infection are noted.  2. Vascular: Dorsalis pedis two out of four bilateral and posterior tibial pulses two out of    four bilateral, capillary refill normal.   3. Musculoskeletal: All muscle groups are graded 5 out of 5 in the foot and ankle.  Decreased plantar fat pad noted bilaterally.  HAV deformity noted bilaterally with tailor bunion deformity noted bilaterally.  Prominent metatarsal heads noted bilaterally.   4. Neurological: Normal sharp dull sensation; reflexes normal.          ASSESSMENT AND PLAN:   Diagnoses and all orders for this visit:    Onychomycosis    Onychocryptosis    Metatarsalgia of both feet    Heloma molle    Toe pain, bilateral    Tailor's bunion of both feet    Left-sided low back pain without sciatica, unspecified chronicity    Valgus deformity of both great toes          Plan:   -Patient examined, chart history reviewed.  -Discussed importance of proper pedal hygiene and regular foot checks.  -Sharply debrided nails x10 with a sterile nail nipper achieving a 20% reduction in thickness and length, without incident. Nails further smoothed  with andrea.  -Pared HPK's x1 with #15 blade healthy tissue without incident.  -Patient can use gauze or toe spacer between fourth and fifth digits of left foot to prevent recurrence of heloma molle.  -Patient can continue using Aquaphor to moisturize sites on his own.   -If symptoms worsen or fail to improve can consider surgical intervention.  -Educated patient on acute signs of infection advised patient to seek immediate medical attention if symptoms arise.    The patient indicates understanding of these issues and agrees to the plan.    RTC 2 months.    Jorgito Garcia DPM

## 2024-05-29 ENCOUNTER — TELEPHONE (OUTPATIENT)
Dept: PODIATRY CLINIC | Facility: CLINIC | Age: 84
End: 2024-05-29

## 2024-05-29 ENCOUNTER — OFFICE VISIT (OUTPATIENT)
Dept: PODIATRY CLINIC | Facility: CLINIC | Age: 84
End: 2024-05-29

## 2024-05-29 DIAGNOSIS — M77.42 METATARSALGIA OF BOTH FEET: ICD-10-CM

## 2024-05-29 DIAGNOSIS — M21.622 TAILOR'S BUNION OF BOTH FEET: ICD-10-CM

## 2024-05-29 DIAGNOSIS — M79.674 TOE PAIN, BILATERAL: ICD-10-CM

## 2024-05-29 DIAGNOSIS — L84 HELOMA MOLLE: ICD-10-CM

## 2024-05-29 DIAGNOSIS — M79.672 PAIN IN BOTH FEET: ICD-10-CM

## 2024-05-29 DIAGNOSIS — M79.675 TOE PAIN, BILATERAL: ICD-10-CM

## 2024-05-29 DIAGNOSIS — B35.1 ONYCHOMYCOSIS: Primary | ICD-10-CM

## 2024-05-29 DIAGNOSIS — M79.671 PAIN IN BOTH FEET: ICD-10-CM

## 2024-05-29 DIAGNOSIS — L84 PRE-ULCERATIVE CALLUSES: ICD-10-CM

## 2024-05-29 DIAGNOSIS — M20.12 VALGUS DEFORMITY OF BOTH GREAT TOES: ICD-10-CM

## 2024-05-29 DIAGNOSIS — M77.41 METATARSALGIA OF BOTH FEET: ICD-10-CM

## 2024-05-29 DIAGNOSIS — M21.621 TAILOR'S BUNION OF BOTH FEET: ICD-10-CM

## 2024-05-29 DIAGNOSIS — M20.11 VALGUS DEFORMITY OF BOTH GREAT TOES: ICD-10-CM

## 2024-05-29 DIAGNOSIS — L60.0 ONYCHOCRYPTOSIS: ICD-10-CM

## 2024-05-29 DIAGNOSIS — M54.50 LEFT-SIDED LOW BACK PAIN WITHOUT SCIATICA, UNSPECIFIED CHRONICITY: ICD-10-CM

## 2024-05-29 PROCEDURE — 99499 UNLISTED E&M SERVICE: CPT | Performed by: STUDENT IN AN ORGANIZED HEALTH CARE EDUCATION/TRAINING PROGRAM

## 2024-05-29 PROCEDURE — 99213 OFFICE O/P EST LOW 20 MIN: CPT | Performed by: STUDENT IN AN ORGANIZED HEALTH CARE EDUCATION/TRAINING PROGRAM

## 2024-05-30 NOTE — PROGRESS NOTES
[facility] Podiatry  Progress Note    Andrzej Morley is a 83 year old male.   Chief Complaint   Patient presents with    Toenail Care     Nail trim and foot check f/u- also here for bilateral foot callus check- rates pain 1/10 on and off         HPI:     Patient is a pleasant 83-year-old male who returns to clinic for evaluation of elongated nails and thickened calluses he has difficulty trimming his own.  He gets recurrent callus between his left fourth and fifth digits that is doing better since he has been using padding to offload.  He has been managing this fairly well with silicone toe pad.  He has minor pain that he rates at a 1 out of 10.  He denies any open sores to his feet or other concerns.  Past medical history, medications, and allergies reviewed.      Allergies: Patient has no known allergies.   Current Outpatient Medications   Medication Sig Dispense Refill    famotidine 20 MG Oral Tab Take 1 tablet (20 mg total) by mouth 2 (two) times daily.      polypropylene glycol- 0.4-0.3 % Ophthalmic Solution Place 1 drop into both eyes as needed.      silodosin 8 MG Oral Cap Take 1 capsule (8 mg total) by mouth every evening.      amLODIPine 5 MG Oral Tab Take 1 tablet (5 mg total) by mouth at bedtime.      Apoaequorin (PREVAGEN OR) Take by mouth daily.      ATORVASTATIN 80 MG Oral Tab TAKE 1 TABLET BY MOUTH EVERY DAY IN THE EVENING 90 tablet 0    naproxen 500 MG Oral Tab Take 1 tablet (500 mg total) by mouth as needed.      Simethicone 180 MG Oral Cap Take by mouth as needed.      psyllium 28 % Oral Powd Pack Take 1 packet by mouth daily.      Cholecalciferol (VITAMIN D) 1000 units Oral Tab Take 100 Units by mouth daily.        Past Medical History:    Actinic keratosis - OSWALD Lee    Cancer (HCC)    prostate    Colon cancer screening [6/2/13] recheck 10 years [At that time, is risk > benefit ?] -  JUANJOSE Zee    Diverticulosis of large intestine    prior doctor told patient, walls thin,  possibility of rupture; careful having a colonoscopy    Esophageal reflux    H/O [ ~ ' 66 ] Rt sialadenitis [stone extraction]    Hearing impairment    High blood pressure    High cholesterol    Hypercholesterolemia    Osteoarthritis    Osteoarthritis of hip    Other and unspecified personal history of malignant neoplasm    Prostate cancer.  Trey 3,3.  Pretreatment psa 4.4    Reflux    S/P [ ~ ' 75 ] vasectomy     Visual impairment      Past Surgical History:   Procedure Laterality Date    Colonoscopy N/A 1/14/2021    Procedure: COLONOSCOPY;  Surgeon: Michael Funes MD;  Location:  ENDOSCOPY    Exc skin benig 1.1-2cm trunk,arm,leg N/A 10/16/2014    Procedure: EXCISION OF SEBACEOUS CYST;  Surgeon: Issac Gonzalez MD;  Location: [facility]    Hip replacement surgery      right hip -2004 - dr merlos    Other surgical history      excision of michelle scys on back- Dr. [id number]    Patient documented not to have experienced any of the following events N/A 10/16/2014    Procedure: EXCISION OF SEBACEOUS CYST;  Surgeon: Issac Gonzalez MD;  Location: [facility]    Patient withough preoperative order for iv antibiotic surgical site infection prophylaxis. N/A 10/16/2014    Procedure: EXCISION OF SEBACEOUS CYST;  Surgeon: Issac Gonzalez MD;  Location: [facility]    Special service or report  Aug 2006    Brachytherapy of the prostate    Tonsillectomy      Total hip replacement Bilateral     Total knee replacement Left       Family History   Problem Relation Age of Onset    Pulmonary Disease Mother         copd    Heart Disease Sister         atrial fibrillation    Heart Attack Father       Social History     Socioeconomic History    Marital status:    Tobacco Use    Smoking status: Former    Smokeless tobacco: Never    Tobacco comments:     quit cigarettes 2006   Vaping Use    Vaping status: Never Used   Substance and Sexual Activity    Alcohol use: Yes     Alcohol/week: 7.0 standard drinks of  alcohol     Types: 7 Standard drinks or equivalent per week     Comment: SOCIAL.    Drug use: No           REVIEW OF SYSTEMS:     Today reviewed systems as documented below  GENERAL HEALTH: feels well otherwise  SKIN: denies any unusual skin lesions or rashes  RESPIRATORY: denies shortness of breath with exertion  CARDIOVASCULAR: denies chest pain on exertion  GI: denies abdominal pain and denies heartburn  NEURO: denies headaches  MUSCULO: History of arthritis      EXAM:   There were no vitals taken for this visit.  GENERAL: well developed, well nourished, in no apparent distress  EXTREMITIES:   1. Integument: Normal skin temperature and turgor.  Nails x10 are elongated, thickened, dystrophic, subungual debris.  Hallux nails are incurvated bilaterally.   Very minimal hyperkeratotic lesion noted between fourth and fifth digits of left foot consistent with heloma molle.   No open lesions or acute signs of infection are noted.  2. Vascular: Dorsalis pedis two out of four bilateral and posterior tibial pulses two out of    four bilateral, capillary refill normal.   3. Musculoskeletal: All muscle groups are graded 5 out of 5 in the foot and ankle.  Decreased plantar fat pad noted bilaterally.  HAV deformity noted bilaterally with tailor bunion deformity noted bilaterally.  Prominent metatarsal heads noted bilaterally.   4. Neurological: Normal sharp dull sensation; reflexes normal.          ASSESSMENT AND PLAN:   Diagnoses and all orders for this visit:    Onychomycosis    Onychocryptosis    Metatarsalgia of both feet    Heloma molle    Toe pain, bilateral    Tailor's bunion of both feet    Valgus deformity of both great toes    Left-sided low back pain without sciatica, unspecified chronicity    Pre-ulcerative calluses    Pain in both feet          Plan:   -Patient examined, chart history reviewed.  -Discussed importance of proper pedal hygiene and regular foot checks.  -Sharply debrided nails x10 with a sterile nail  nipper achieving a 20% reduction in thickness and length, without incident. Nails further smoothed with dremel.  -Pared HPK's x1 with #15 blade healthy tissue without incident.  -Patient can use gauze or toe spacer between fourth and fifth digits of left foot to prevent recurrence of heloma molle.  -Patient can continue using Aquaphor to moisturize sites on his own.   -If symptoms worsen or fail to improve can consider surgical intervention.  -Educated patient on acute signs of infection advised patient to seek immediate medical attention if symptoms arise.    The patient indicates understanding of these issues and agrees to the plan.    RTC 2 months.    Jorgito Garcia DPM

## 2024-07-30 ENCOUNTER — OFFICE VISIT (OUTPATIENT)
Dept: PODIATRY CLINIC | Facility: CLINIC | Age: 84
End: 2024-07-30
Payer: MEDICARE

## 2024-07-30 DIAGNOSIS — L84 HELOMA MOLLE: ICD-10-CM

## 2024-07-30 DIAGNOSIS — L84 PRE-ULCERATIVE CALLUSES: ICD-10-CM

## 2024-07-30 DIAGNOSIS — M21.621 TAILOR'S BUNION OF BOTH FEET: ICD-10-CM

## 2024-07-30 DIAGNOSIS — B35.1 ONYCHOMYCOSIS: Primary | ICD-10-CM

## 2024-07-30 DIAGNOSIS — M77.42 METATARSALGIA OF BOTH FEET: ICD-10-CM

## 2024-07-30 DIAGNOSIS — L60.0 ONYCHOCRYPTOSIS: ICD-10-CM

## 2024-07-30 DIAGNOSIS — M79.672 PAIN IN BOTH FEET: ICD-10-CM

## 2024-07-30 DIAGNOSIS — M21.622 TAILOR'S BUNION OF BOTH FEET: ICD-10-CM

## 2024-07-30 DIAGNOSIS — M79.671 PAIN IN BOTH FEET: ICD-10-CM

## 2024-07-30 DIAGNOSIS — M20.12 VALGUS DEFORMITY OF BOTH GREAT TOES: ICD-10-CM

## 2024-07-30 DIAGNOSIS — M54.50 LEFT-SIDED LOW BACK PAIN WITHOUT SCIATICA, UNSPECIFIED CHRONICITY: ICD-10-CM

## 2024-07-30 DIAGNOSIS — M79.674 TOE PAIN, BILATERAL: ICD-10-CM

## 2024-07-30 DIAGNOSIS — M20.11 VALGUS DEFORMITY OF BOTH GREAT TOES: ICD-10-CM

## 2024-07-30 DIAGNOSIS — M77.41 METATARSALGIA OF BOTH FEET: ICD-10-CM

## 2024-07-30 DIAGNOSIS — M79.675 TOE PAIN, BILATERAL: ICD-10-CM

## 2024-07-30 PROCEDURE — 99213 OFFICE O/P EST LOW 20 MIN: CPT | Performed by: STUDENT IN AN ORGANIZED HEALTH CARE EDUCATION/TRAINING PROGRAM

## 2024-07-30 PROCEDURE — 99499 UNLISTED E&M SERVICE: CPT | Performed by: STUDENT IN AN ORGANIZED HEALTH CARE EDUCATION/TRAINING PROGRAM

## 2024-07-30 NOTE — PROGRESS NOTES
[facility] Podiatry  Progress Note    Andrzej Morley is a 83 year old male.   Chief Complaint   Patient presents with    Toenail Care     Nail care and foot check- callus check -          HPI:     Patient is a pleasant 83-year-old male who returns to clinic for evaluation of elongated nails and thickened calluses he has difficulty trimming his own.  He gets recurrent callus between his left fourth and fifth digits that is doing better since he has been using padding to offload.  He has been managing this fairly well with silicone toe pad.  He denies any open sores to his feet or other concerns.  Past medical history, medications, and allergies reviewed.      Allergies: Patient has no known allergies.   Current Outpatient Medications   Medication Sig Dispense Refill    famotidine 20 MG Oral Tab Take 1 tablet (20 mg total) by mouth 2 (two) times daily.      polypropylene glycol- 0.4-0.3 % Ophthalmic Solution Place 1 drop into both eyes as needed.      amLODIPine 5 MG Oral Tab Take 1 tablet (5 mg total) by mouth at bedtime.      Apoaequorin (PREVAGEN OR) Take by mouth daily.      ATORVASTATIN 80 MG Oral Tab TAKE 1 TABLET BY MOUTH EVERY DAY IN THE EVENING 90 tablet 0    naproxen 500 MG Oral Tab Take 1 tablet (500 mg total) by mouth as needed.      psyllium 28 % Oral Powd Pack Take 1 packet by mouth daily.      Cholecalciferol (VITAMIN D) 1000 units Oral Tab Take 100 Units by mouth daily.      silodosin 8 MG Oral Cap Take 1 capsule (8 mg total) by mouth every evening.      Simethicone 180 MG Oral Cap Take by mouth as needed.        Past Medical History:    Actinic keratosis - OSWALD Lee    Cancer (HCC)    prostate    Colon cancer screening [6/2/13] recheck 10 years [At that time, is risk > benefit ?] -  JUANJOSE Zee    Diverticulosis of large intestine    prior doctor told patient, walls thin, possibility of rupture; careful having a colonoscopy    Esophageal reflux    H/O [ ~ ' 66 ] Rt sialadenitis [stone  extraction]    Hearing impairment    High blood pressure    High cholesterol    Hypercholesterolemia    Osteoarthritis    Osteoarthritis of hip    Other and unspecified personal history of malignant neoplasm    Prostate cancer.  Trey 3,3.  Pretreatment psa 4.4    Reflux    S/P [ ~ ' 75 ] vasectomy     Visual impairment      Past Surgical History:   Procedure Laterality Date    Colonoscopy N/A 1/14/2021    Procedure: COLONOSCOPY;  Surgeon: Michael Funes MD;  Location:  ENDOSCOPY    Exc skin benig 1.1-2cm trunk,arm,leg N/A 10/16/2014    Procedure: EXCISION OF SEBACEOUS CYST;  Surgeon: Issac Gonzalez MD;  Location: [facility]    Hip replacement surgery      right hip -2004 - dr merlos    Other surgical history      excision of michelle scys on back- Dr. [id number]    Patient documented not to have experienced any of the following events N/A 10/16/2014    Procedure: EXCISION OF SEBACEOUS CYST;  Surgeon: Issac Gonzalez MD;  Location: [facility]    Patient withough preoperative order for iv antibiotic surgical site infection prophylaxis. N/A 10/16/2014    Procedure: EXCISION OF SEBACEOUS CYST;  Surgeon: Issac Gonzalez MD;  Location: [facility]    Special service or report  Aug 2006    Brachytherapy of the prostate    Tonsillectomy      Total hip replacement Bilateral     Total knee replacement Left       Family History   Problem Relation Age of Onset    Pulmonary Disease Mother         copd    Heart Disease Sister         atrial fibrillation    Heart Attack Father       Social History     Socioeconomic History    Marital status:    Tobacco Use    Smoking status: Former    Smokeless tobacco: Never    Tobacco comments:     quit cigarettes 2006   Vaping Use    Vaping status: Never Used   Substance and Sexual Activity    Alcohol use: Yes     Alcohol/week: 7.0 standard drinks of alcohol     Types: 7 Standard drinks or equivalent per week     Comment: SOCIAL.    Drug use: No           REVIEW OF  SYSTEMS:     Today reviewed systems as documented below  GENERAL HEALTH: feels well otherwise  SKIN: denies any unusual skin lesions or rashes  RESPIRATORY: denies shortness of breath with exertion  CARDIOVASCULAR: denies chest pain on exertion  GI: denies abdominal pain and denies heartburn  NEURO: denies headaches  MUSCULO: History of arthritis      EXAM:   There were no vitals taken for this visit.  GENERAL: well developed, well nourished, in no apparent distress  EXTREMITIES:   1. Integument: Normal skin temperature and turgor.  Nails x10 are elongated, thickened, dystrophic, subungual debris.  Hallux nails are incurvated bilaterally.   Very minimal hyperkeratotic lesion noted between fourth and fifth digits of left foot consistent with heloma molle.   No open lesions or acute signs of infection are noted.  2. Vascular: Dorsalis pedis two out of four bilateral and posterior tibial pulses two out of    four bilateral, capillary refill normal.   3. Musculoskeletal: All muscle groups are graded 5 out of 5 in the foot and ankle.  Decreased plantar fat pad noted bilaterally.  HAV deformity noted bilaterally with tailor bunion deformity noted bilaterally.  Prominent metatarsal heads noted bilaterally.   4. Neurological: Normal sharp dull sensation; reflexes normal.          ASSESSMENT AND PLAN:   Diagnoses and all orders for this visit:    Onychomycosis    Onychocryptosis    Metatarsalgia of both feet    Heloma molle    Toe pain, bilateral    Tailor's bunion of both feet    Valgus deformity of both great toes    Left-sided low back pain without sciatica, unspecified chronicity    Pre-ulcerative calluses    Pain in both feet            Plan:   -Patient examined, chart history reviewed.  -Discussed importance of proper pedal hygiene and regular foot checks.  -Sharply debrided nails x10 with a sterile nail nipper achieving a 20% reduction in thickness and length, without incident. Nails further smoothed with dremel.  Pinpoint bleeding noted that was dressed with bacitracin.  -Pared HPK's x1 with #15 blade healthy tissue without incident.  -Patient can use gauze or toe spacer between fourth and fifth digits of left foot to prevent recurrence of heloma molle.  -Patient can continue using Aquaphor to moisturize sites on his own.   -If symptoms worsen or fail to improve can consider surgical intervention.  -Educated patient on acute signs of infection advised patient to seek immediate medical attention if symptoms arise.    The patient indicates understanding of these issues and agrees to the plan.    RTC 2 months.    Jorgito Garcia DPM

## 2024-10-02 ENCOUNTER — OFFICE VISIT (OUTPATIENT)
Dept: PODIATRY CLINIC | Facility: CLINIC | Age: 84
End: 2024-10-02
Payer: MEDICARE

## 2024-10-02 DIAGNOSIS — M77.41 METATARSALGIA OF BOTH FEET: ICD-10-CM

## 2024-10-02 DIAGNOSIS — M20.11 VALGUS DEFORMITY OF BOTH GREAT TOES: ICD-10-CM

## 2024-10-02 DIAGNOSIS — L84 HELOMA MOLLE: ICD-10-CM

## 2024-10-02 DIAGNOSIS — M20.12 VALGUS DEFORMITY OF BOTH GREAT TOES: ICD-10-CM

## 2024-10-02 DIAGNOSIS — M77.42 METATARSALGIA OF BOTH FEET: ICD-10-CM

## 2024-10-02 DIAGNOSIS — L60.0 ONYCHOCRYPTOSIS: ICD-10-CM

## 2024-10-02 DIAGNOSIS — M21.622 TAILOR'S BUNION OF BOTH FEET: ICD-10-CM

## 2024-10-02 DIAGNOSIS — M21.621 TAILOR'S BUNION OF BOTH FEET: ICD-10-CM

## 2024-10-02 DIAGNOSIS — B35.1 ONYCHOMYCOSIS: Primary | ICD-10-CM

## 2024-10-02 PROCEDURE — 99213 OFFICE O/P EST LOW 20 MIN: CPT | Performed by: STUDENT IN AN ORGANIZED HEALTH CARE EDUCATION/TRAINING PROGRAM

## 2024-10-02 NOTE — PROGRESS NOTES
[facility] Podiatry  Progress Note    Andrzej Morley is a 83 year old male.   Chief Complaint   Patient presents with    Toenail Care     Nail care and callus care          HPI:     Patient is a pleasant 83-year-old male who returns to clinic for evaluation of elongated nails and thickened calluses he has difficulty trimming his own.  He gets recurrent callus between his left fourth and fifth digits that is doing better since he has been using padding to offload.  He has been managing this fairly well with silicone toe pad.  He denies any open sores to his feet or other concerns.  Past medical history, medications, and allergies reviewed.      Allergies: Patient has no known allergies.   Current Outpatient Medications   Medication Sig Dispense Refill    famotidine 20 MG Oral Tab Take 1 tablet (20 mg total) by mouth 2 (two) times daily.      polypropylene glycol- 0.4-0.3 % Ophthalmic Solution Place 1 drop into both eyes as needed.      amLODIPine 5 MG Oral Tab Take 1 tablet (5 mg total) by mouth at bedtime.      Apoaequorin (PREVAGEN OR) Take by mouth daily.      ATORVASTATIN 80 MG Oral Tab TAKE 1 TABLET BY MOUTH EVERY DAY IN THE EVENING 90 tablet 0    naproxen 500 MG Oral Tab Take 1 tablet (500 mg total) by mouth as needed.      psyllium 28 % Oral Powd Pack Take 1 packet by mouth daily.      Cholecalciferol (VITAMIN D) 1000 units Oral Tab Take 100 Units by mouth daily.      silodosin 8 MG Oral Cap Take 1 capsule (8 mg total) by mouth every evening.      Simethicone 180 MG Oral Cap Take by mouth as needed.        Past Medical History:    Actinic keratosis - OSWALD Lee    Cancer (HCC)    prostate    Colon cancer screening [6/2/13] recheck 10 years [At that time, is risk > benefit ?] -  JUANJOSE Zee    Diverticulosis of large intestine    prior doctor told patient, walls thin, possibility of rupture; careful having a colonoscopy    Esophageal reflux    H/O [ ~ ' 68 ] Rt sialadenitis [stone extraction]     Hearing impairment    High blood pressure    High cholesterol    Hypercholesterolemia    Osteoarthritis    Osteoarthritis of hip    Other and unspecified personal history of malignant neoplasm    Prostate cancer.  [place] 3,3.  Pretreatment psa 4.4    Reflux    S/P [ ~ ' 75 ] vasectomy     Visual impairment      Past Surgical History:   Procedure Laterality Date    Colonoscopy N/A 1/14/2021    Procedure: COLONOSCOPY;  Surgeon: Michael Funes MD;  Location:  ENDOSCOPY    Exc skin benig 1.1-2cm trunk,arm,leg N/A 10/16/2014    Procedure: EXCISION OF SEBACEOUS CYST;  Surgeon: Issac Gonzalez MD;  Location: [facility]    Hip replacement surgery      right hip -2004 - dr merlos    Other surgical history      excision of michelle scys on back- Dr. [id number]    Patient documented not to have experienced any of the following events N/A 10/16/2014    Procedure: EXCISION OF SEBACEOUS CYST;  Surgeon: Issac Gonzalez MD;  Location: [facility]    Patient withough preoperative order for iv antibiotic surgical site infection prophylaxis. N/A 10/16/2014    Procedure: EXCISION OF SEBACEOUS CYST;  Surgeon: Issac Gonzalez MD;  Location: [facility]    Special service or report  Aug 2006    Brachytherapy of the prostate    Tonsillectomy      Total hip replacement Bilateral     Total knee replacement Left       Family History   Problem Relation Age of Onset    Pulmonary Disease Mother         copd    Heart Disease Sister         atrial fibrillation    Heart Attack Father       Social History     Socioeconomic History    Marital status:    Tobacco Use    Smoking status: Former    Smokeless tobacco: Never    Tobacco comments:     quit cigarettes 2006   Vaping Use    Vaping status: Never Used   Substance and Sexual Activity    Alcohol use: Yes     Alcohol/week: 7.0 standard drinks of alcohol     Types: 7 Standard drinks or equivalent per week     Comment: SOCIAL.    Drug use: No           REVIEW OF SYSTEMS:     Today  reviewed systems as documented below  GENERAL HEALTH: feels well otherwise  SKIN: denies any unusual skin lesions or rashes  RESPIRATORY: denies shortness of breath with exertion  CARDIOVASCULAR: denies chest pain on exertion  GI: denies abdominal pain and denies heartburn  NEURO: denies headaches  MUSCULO: History of arthritis      EXAM:   There were no vitals taken for this visit.  GENERAL: well developed, well nourished, in no apparent distress  EXTREMITIES:   1. Integument: Normal skin temperature and turgor.  Nails x10 are elongated, thickened, dystrophic, subungual debris.  Hallux nails are incurvated bilaterally.   Very minimal hyperkeratotic lesion noted between fourth and fifth digits of left foot consistent with heloma molle.   No open lesions or acute signs of infection are noted.  2. Vascular: Dorsalis pedis two out of four bilateral and posterior tibial pulses two out of    four bilateral, capillary refill normal.   3. Musculoskeletal: All muscle groups are graded 5 out of 5 in the foot and ankle.  Decreased plantar fat pad noted bilaterally.  HAV deformity noted bilaterally with tailor bunion deformity noted bilaterally.  Prominent metatarsal heads noted bilaterally.   4. Neurological: Normal sharp dull sensation; reflexes normal.          ASSESSMENT AND PLAN:   Diagnoses and all orders for this visit:    Onychomycosis    Onychocryptosis    Metatarsalgia of both feet    Heloma molle    Tailor's bunion of both feet    Valgus deformity of both great toes            Plan:   -Patient examined, chart history reviewed.  -Discussed importance of proper pedal hygiene and regular foot checks.  -Sharply debrided nails x10 with a sterile nail nipper achieving a 20% reduction in thickness and length, without incident. Nails further smoothed with dremel. Pinpoint bleeding noted that was dressed with bacitracin.  -Pared HPKs x 2 with #15 blade healthy tissue without incident.  -Patient can use gauze or toe spacer  between fourth and fifth digits of left foot to prevent recurrence of heloma molle.  -Patient can continue using Aquaphor to moisturize sites on his own.   -If symptoms worsen or fail to improve can consider surgical intervention.  -Educated patient on acute signs of infection advised patient to seek immediate medical attention if symptoms arise.    The patient indicates understanding of these issues and agrees to the plan.    RTC 2 months.    Jorgito Garcia DPM

## 2024-12-04 ENCOUNTER — OFFICE VISIT (OUTPATIENT)
Dept: PODIATRY CLINIC | Facility: CLINIC | Age: 84
End: 2024-12-04
Payer: MEDICARE

## 2024-12-04 DIAGNOSIS — M20.12 VALGUS DEFORMITY OF BOTH GREAT TOES: ICD-10-CM

## 2024-12-04 DIAGNOSIS — L84 HELOMA MOLLE: ICD-10-CM

## 2024-12-04 DIAGNOSIS — M79.671 PAIN IN BOTH FEET: ICD-10-CM

## 2024-12-04 DIAGNOSIS — M20.11 VALGUS DEFORMITY OF BOTH GREAT TOES: ICD-10-CM

## 2024-12-04 DIAGNOSIS — M21.622 TAILOR'S BUNION OF BOTH FEET: ICD-10-CM

## 2024-12-04 DIAGNOSIS — M21.621 TAILOR'S BUNION OF BOTH FEET: ICD-10-CM

## 2024-12-04 DIAGNOSIS — B35.1 ONYCHOMYCOSIS: Primary | ICD-10-CM

## 2024-12-04 DIAGNOSIS — M77.42 METATARSALGIA OF BOTH FEET: ICD-10-CM

## 2024-12-04 DIAGNOSIS — M79.672 PAIN IN BOTH FEET: ICD-10-CM

## 2024-12-04 DIAGNOSIS — M54.50 LEFT-SIDED LOW BACK PAIN WITHOUT SCIATICA, UNSPECIFIED CHRONICITY: ICD-10-CM

## 2024-12-04 DIAGNOSIS — M77.41 METATARSALGIA OF BOTH FEET: ICD-10-CM

## 2024-12-04 DIAGNOSIS — L60.0 ONYCHOCRYPTOSIS: ICD-10-CM

## 2024-12-04 PROCEDURE — 99213 OFFICE O/P EST LOW 20 MIN: CPT | Performed by: STUDENT IN AN ORGANIZED HEALTH CARE EDUCATION/TRAINING PROGRAM

## 2024-12-04 NOTE — PROGRESS NOTES
[facility] Podiatry  Progress Note    Andrzej Morley is a 84 year old male.   No chief complaint on file.        HPI:     Patient is a pleasant 84-year-old male who returns to clinic for evaluation of elongated nails and thickened calluses he has difficulty trimming his own.  He gets recurrent callus between his left fourth and fifth digits that is doing better since he has been using padding to offload.  He has been managing this fairly well with silicone toe pad.  He denies any open sores to his feet or other concerns.  Past medical history, medications, and allergies reviewed.      Allergies: Patient has no known allergies.   Current Outpatient Medications   Medication Sig Dispense Refill    famotidine 20 MG Oral Tab Take 1 tablet (20 mg total) by mouth 2 (two) times daily.      polypropylene glycol- 0.4-0.3 % Ophthalmic Solution Place 1 drop into both eyes as needed.      silodosin 8 MG Oral Cap Take 1 capsule (8 mg total) by mouth every evening.      amLODIPine 5 MG Oral Tab Take 1 tablet (5 mg total) by mouth at bedtime.      Apoaequorin (PREVAGEN OR) Take by mouth daily.      ATORVASTATIN 80 MG Oral Tab TAKE 1 TABLET BY MOUTH EVERY DAY IN THE EVENING 90 tablet 0    naproxen 500 MG Oral Tab Take 1 tablet (500 mg total) by mouth as needed.      Simethicone 180 MG Oral Cap Take by mouth as needed.      psyllium 28 % Oral Powd Pack Take 1 packet by mouth daily.      Cholecalciferol (VITAMIN D) 1000 units Oral Tab Take 100 Units by mouth daily.        Past Medical History:    Actinic keratosis - OSWALD Lee    Cancer (HCC)    prostate    Colon cancer screening [6/2/13] recheck 10 years [At that time, is risk > benefit ?] -  JUANJOSE Zee    Diverticulosis of large intestine    prior doctor told patient, walls thin, possibility of rupture; careful having a colonoscopy    Esophageal reflux    H/O [ ~ ' 66 ] Rt sialadenitis [stone extraction]    Hearing impairment    High blood pressure    High cholesterol     Hypercholesterolemia    Osteoarthritis    Osteoarthritis of hip    Other and unspecified personal history of malignant neoplasm    Prostate cancer.  [place] 3,3.  Pretreatment psa 4.4    Reflux    S/P [ ~ ' 75 ] vasectomy     Visual impairment      Past Surgical History:   Procedure Laterality Date    Colonoscopy N/A 1/14/2021    Procedure: COLONOSCOPY;  Surgeon: Michael Funes MD;  Location:  ENDOSCOPY    Exc skin benig 1.1-2cm trunk,arm,leg N/A 10/16/2014    Procedure: EXCISION OF SEBACEOUS CYST;  Surgeon: Issac Gonzalez MD;  Location: [facility]    Hip replacement surgery      right hip -2004 - dr merlos    Other surgical history      excision of michelle scys on back- Dr. [id number]    Patient documented not to have experienced any of the following events N/A 10/16/2014    Procedure: EXCISION OF SEBACEOUS CYST;  Surgeon: Issac Gonzalez MD;  Location: [facility]    Patient withough preoperative order for iv antibiotic surgical site infection prophylaxis. N/A 10/16/2014    Procedure: EXCISION OF SEBACEOUS CYST;  Surgeon: Issac Gonzalez MD;  Location: [facility]    Special service or report  Aug 2006    Brachytherapy of the prostate    Tonsillectomy      Total hip replacement Bilateral     Total knee replacement Left       Family History   Problem Relation Age of Onset    Pulmonary Disease Mother         copd    Heart Disease Sister         atrial fibrillation    Heart Attack Father       Social History     Socioeconomic History    Marital status:    Tobacco Use    Smoking status: Former    Smokeless tobacco: Never    Tobacco comments:     quit cigarettes 2006   Vaping Use    Vaping status: Never Used   Substance and Sexual Activity    Alcohol use: Yes     Alcohol/week: 7.0 standard drinks of alcohol     Types: 7 Standard drinks or equivalent per week     Comment: SOCIAL.    Drug use: No           REVIEW OF SYSTEMS:     Today reviewed systems as documented below  GENERAL HEALTH: feels well  otherwise  SKIN: denies any unusual skin lesions or rashes  RESPIRATORY: denies shortness of breath with exertion  CARDIOVASCULAR: denies chest pain on exertion  GI: denies abdominal pain and denies heartburn  NEURO: denies headaches  MUSCULO: History of arthritis      EXAM:   There were no vitals taken for this visit.  GENERAL: well developed, well nourished, in no apparent distress  EXTREMITIES:   1. Integument: Normal skin temperature and turgor.  Nails x10 are elongated, thickened, dystrophic, subungual debris.  Hallux nails are incurvated bilaterally.   Very minimal hyperkeratotic lesion noted between fourth and fifth digits of left foot consistent with heloma molle.   No open lesions or acute signs of infection are noted.  2. Vascular: Dorsalis pedis two out of four bilateral and posterior tibial pulses two out of    four bilateral, capillary refill normal.   3. Musculoskeletal: All muscle groups are graded 5 out of 5 in the foot and ankle.  Decreased plantar fat pad noted bilaterally.  HAV deformity noted bilaterally with tailor bunion deformity noted bilaterally.  Prominent metatarsal heads noted bilaterally.   4. Neurological: Normal sharp dull sensation; reflexes normal.          ASSESSMENT AND PLAN:   Diagnoses and all orders for this visit:    Onychomycosis    Onychocryptosis    Metatarsalgia of both feet    Heloma molle    Tailor's bunion of both feet    Valgus deformity of both great toes    Left-sided low back pain without sciatica, unspecified chronicity    Pain in both feet              Plan:   -Patient examined, chart history reviewed.  -Discussed importance of proper pedal hygiene and regular foot checks.  -Sharply debrided nails x10 with a sterile nail nipper achieving a 20% reduction in thickness and length, without incident. Nails further smoothed with dremel. Pinpoint bleeding noted that was dressed with bacitracin.  -Pared HPKs x 2 with #15 blade healthy tissue without incident.  -Patient can  use gauze or toe spacer between fourth and fifth digits of left foot to prevent recurrence of heloma molle.  -Patient can continue using Aquaphor to moisturize sites on his own.   -If symptoms worsen or fail to improve can consider surgical intervention.  -Educated patient on acute signs of infection advised patient to seek immediate medical attention if symptoms arise.    The patient indicates understanding of these issues and agrees to the plan.    RTC 2 months.    Jorgito Garcia DPM

## 2025-01-04 ENCOUNTER — HOSPITAL ENCOUNTER (EMERGENCY)
Facility: HOSPITAL | Age: 85
Discharge: HOME | End: 2025-01-05
Attending: EMERGENCY MEDICINE
Payer: MEDICARE

## 2025-01-04 DIAGNOSIS — N30.01 ACUTE CYSTITIS WITH HEMATURIA: Primary | ICD-10-CM

## 2025-01-04 PROCEDURE — 81001 URINALYSIS AUTO W/SCOPE: CPT | Performed by: EMERGENCY MEDICINE

## 2025-01-04 PROCEDURE — 87086 URINE CULTURE/COLONY COUNT: CPT | Performed by: EMERGENCY MEDICINE

## 2025-01-04 RX ORDER — LIDOCAINE HYDROCHLORIDE 20 MG/ML
JELLY TOPICAL
Status: COMPLETED
Start: 2025-01-04 | End: 2025-01-04

## 2025-01-04 RX ORDER — LIDOCAINE HYDROCHLORIDE 20 MG/ML
10 JELLY TOPICAL ONCE
Status: COMPLETED | OUTPATIENT
Start: 2025-01-04 | End: 2025-01-04

## 2025-01-05 VITALS
TEMPERATURE: 97 F | WEIGHT: 200 LBS | SYSTOLIC BLOOD PRESSURE: 130 MMHG | DIASTOLIC BLOOD PRESSURE: 73 MMHG | BODY MASS INDEX: 28 KG/M2 | HEIGHT: 71 IN | HEART RATE: 63 BPM | OXYGEN SATURATION: 100 % | RESPIRATION RATE: 16 BRPM

## 2025-01-05 VITALS
HEIGHT: 71 IN | DIASTOLIC BLOOD PRESSURE: 85 MMHG | WEIGHT: 200 LBS | HEART RATE: 74 BPM | SYSTOLIC BLOOD PRESSURE: 163 MMHG | RESPIRATION RATE: 16 BRPM | TEMPERATURE: 97 F | BODY MASS INDEX: 28 KG/M2 | OXYGEN SATURATION: 98 %

## 2025-01-05 DIAGNOSIS — N30.90 CYSTITIS: ICD-10-CM

## 2025-01-05 DIAGNOSIS — R33.9 URINARY RETENTION: Primary | ICD-10-CM

## 2025-01-05 LAB
RBC #/AREA URNS AUTO: >10 /HPF
SP GR UR REFRACTOMETRY: 1.02 (ref 1–1.03)

## 2025-01-05 RX ORDER — KETOROLAC TROMETHAMINE 30 MG/ML
30 INJECTION, SOLUTION INTRAMUSCULAR; INTRAVENOUS ONCE
Status: DISCONTINUED | OUTPATIENT
Start: 2025-01-05 | End: 2025-01-05

## 2025-01-05 RX ORDER — CEPHALEXIN 500 MG/1
500 CAPSULE ORAL 2 TIMES DAILY
Qty: 14 CAPSULE | Refills: 0 | Status: SHIPPED | OUTPATIENT
Start: 2025-01-05 | End: 2025-01-12

## 2025-01-05 RX ORDER — LIDOCAINE HYDROCHLORIDE 20 MG/ML
10 JELLY TOPICAL ONCE
Status: COMPLETED | OUTPATIENT
Start: 2025-01-05 | End: 2025-01-05

## 2025-01-05 NOTE — ED PROVIDER NOTES
Patient Seen in: [facility] Emergency Department      History     Chief Complaint   Patient presents with    Urinary Symptoms     Stated Complaint: Unable to urinate- last time was able to go was yesterday    Subjective:   HPI      84-year-old male comes to the hospital complaint of having an inability to urinate completely since this morning and feels he is retaining.  He knows that there was some blood in his urine as well.  Is a history of having prostate cancer as well as scarring that is needed procedures for her and has had some mild retention in the past.  He denies any flank pain.  No fevers or chills.  Denies any nausea or vomiting.  He is denying any other specific complaints at this time.    Objective:     No pertinent past medical history.            No pertinent past surgical history.              No pertinent social history.                Physical Exam     ED Triage Vitals [01/04/25 2246]   BP (!) 163/85   Pulse 88   Resp 20   Temp 97 °F (36.1 °C)   Temp src Temporal   SpO2 96 %   O2 Device None (Room air)       Current Vitals:   Vital Signs  BP: (!) 163/85  Pulse: 74  Resp: 16  Temp: 97 °F (36.1 °C)  Temp src: Temporal    Oxygen Therapy  SpO2: 98 %  O2 Device: None (Room air)        Physical Exam  HEENT : NCAT, EOMI, PEERL,  neck supple, no JVD, trachea midline, No LAD  Heart: S1S2 normal. No murmurs, regular rate and rhythm  Lungs: Clear to auscultation bilaterally  Abdomen: Soft suprapubic fullness with some tenderness noted nondistended normal active bowel sounds without rebound, guarding or masses noted  Back nontender without CVA tenderness  Extremity no clubbing, cyanosis or edema noted.  Full range of motion noted without tenderness  Neuro: No focal deficits noted    All measures to prevent infection transmission during my interaction with the patient were taken.  The patient was already wearing droplet mask on my arrival to the room.  Personal protective equipment including a droplet  mask as well as gloves were worn throughout the duration of my exam.  Hand washing was performed prior to and after the exam.  Stethoscope and equipment used during my examination was cleaned with a super Sani cloth germicidal wipe following the exam.    ED Course     Labs Reviewed   URINALYSIS WITH CULTURE REFLEX       ED Course as of 01/05/25 0004  ------------------------------------------------------------  Time: 01/05 0002  Comment: Noted the patient had a Royal put in place and only had 300 cc of urine removed.  Bladder scan shows it not to be empty.  Patient's urine is consistent with urinary tract infection              MDM      Differential diagnosis included urinary tension, UTI, other nephritis but limited such.  Patient here was diagnosed with urine tract infection at this time but discharged home with further patient management care with antibiotics.    Patient was screened and evaluated during this visit.   As a treating physician attending to the patient, I determined, within reasonable clinical confidence and prior to discharge, that an emergency medical condition was not or was no longer present.  There was no indication for further evaluation, treatment or admission on an emergency basis.       The usual and customary discharge instuctions were discussed given the patient's ER course.  We discussed signs and symptoms that should prompt the patient's immediate return to the emergency department.   Reasonable over the counter and prescription treatment options and Physician follow up plan was discussed.       The patient is discharged in good condition.     This note was prepared using Dragon Medical voice recognition dictation software.  As a result errors may occur.  When identified to these areas have been corrected.  While every attempt is made to correct errors during dictation discrepancies may still exist.  Please contact if there are any errors.        Medical Decision Making      Disposition  and Plan     Clinical Impression:  1. Acute cystitis with hematuria         Disposition:  Discharge  1/5/2025 12:03 am    Follow-up:  Victor Manuel Aguero MD  [address]  [facility] [id number] [phone]    Schedule an appointment as soon as possible for a visit in 2 day(s)      Octavio Varela MD  [address]  [facility] [id number] [phone]    Schedule an appointment as soon as possible for a visit in 2 day(s)            Medications Prescribed:  Current Discharge Medication List        START taking these medications    Details   cephALEXin 500 MG Oral Cap Take 1 capsule (500 mg total) by mouth 2 (two) times daily for 7 days.  Qty: 14 capsule, Refills: 0                 Supplementary Documentation:

## 2025-01-05 NOTE — ED INITIAL ASSESSMENT (HPI)
Pt presents to the ED with c/o inability to urinate. Pt was seen here last night for same, was straight catheterized and discharged home. Pt states when he got home he passed a large amount of blood clots but no urine. Pt awake and alert, skin w/d,resps reg/unlabored.

## 2025-01-05 NOTE — ED INITIAL ASSESSMENT (HPI)
Patient here with c/o urinary retention.  Patient reports he has only had small amounts of blood when he tries to urinate.  Has not been really able to urinate since yesterday.  Patient not on thinners.

## 2025-01-05 NOTE — ED PROVIDER NOTES
Patient Seen in: [facility] Emergency Department      History     Chief Complaint   Patient presents with    Urinary Symptoms     Unable to urinate since last night, was seen here yesterday for same symptoms.      Stated Complaint:     Subjective:     HPI    84-year-old male with prostate cancer (brachytherapy), hypertension who was in the emergency room yesterday with urinary retention and had catheter placed with 300 cc urine removed.  Urine was consistent with UTI and patient prescribed cephalexin, which she has not picked up yet since he left the ER at 1 AM.  He reports that his bladder is full again and passing blood clots at home. No fever reported.  He does not take an anticoagulant    Objective:   Past Medical History:    Actinic keratosis - OSWALD Lee    Cancer (HCC)    prostate    Colon cancer screening [6/2/13] recheck 10 years [At that time, is risk > benefit ?] -  JUANJOSE Zee    Diverticulosis of large intestine    prior doctor told patient, walls thin, possibility of rupture; careful having a colonoscopy    Esophageal reflux    H/O [ ~ ' 66 ] Rt sialadenitis [stone extraction]    Hearing impairment    High blood pressure    High cholesterol    Hypercholesterolemia    Osteoarthritis    Osteoarthritis of hip    Other and unspecified personal history of malignant neoplasm    Prostate cancer.  [place] 3,3.  Pretreatment psa 4.4    Reflux    S/P [ ~ ' 75 ] vasectomy     Visual impairment              Past Surgical History:   Procedure Laterality Date    Colonoscopy N/A 1/14/2021    Procedure: COLONOSCOPY;  Surgeon: Michael Funes MD;  Location:  ENDOSCOPY    Exc skin benig 1.1-2cm trunk,arm,leg N/A 10/16/2014    Procedure: EXCISION OF SEBACEOUS CYST;  Surgeon: Issac Gonzalez MD;  Location: [facility]    Hip replacement surgery      right hip -2004 - dr merlos    Other surgical history      excision of michelle scys on back- Dr. [id number]    Patient documented not to have experienced any of the  following events N/A 10/16/2014    Procedure: EXCISION OF SEBACEOUS CYST;  Surgeon: Issac Gonzalez MD;  Location: [facility]    Patient withough preoperative order for iv antibiotic surgical site infection prophylaxis. N/A 10/16/2014    Procedure: EXCISION OF SEBACEOUS CYST;  Surgeon: Issac Gonzalez MD;  Location: [facility]    Special service or report  Aug 2006    Brachytherapy of the prostate    Tonsillectomy      Total hip replacement Bilateral     Total knee replacement Left                 Social History     Socioeconomic History    Marital status:    Tobacco Use    Smoking status: Former    Smokeless tobacco: Never    Tobacco comments:     quit cigarettes 2006   Vaping Use    Vaping status: Never Used   Substance and Sexual Activity    Alcohol use: Yes     Alcohol/week: 7.0 standard drinks of alcohol     Types: 7 Standard drinks or equivalent per week     Comment: SOCIAL.    Drug use: No   Social History Narrative     - 1961. Wife - jan - is healthy - atrial fibrillation and patellar fracture. 2d- 1d- 49 [place]- 3 sons- 18,20, 22 ; 1d- 47 [place], [place]  and stepson -23. 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 Lives in [place]. Exercise - leg lifts. Works at [employer].      Social Drivers of Health      [facility], [facility]    Social Connections    Received from [facility], [facility]    Housing Stability              Review of Systems    Positive for stated complaint:   Other systems are as noted in HPI.  Constitutional and vital signs reviewed.      All other systems reviewed and negative except as noted above.    Physical Exam     ED Triage Vitals   BP 01/05/25 0741 134/77   Pulse 01/05/25 0741 77   Resp 01/05/25 0741 16   Temp 01/05/25 0741 97 °F (36.1 °C)   Temp src 01/05/25 0741 Temporal   SpO2 01/05/25 0758 100 %    O2 Device 01/05/25 0741 None (Room air)       Current:/73   Pulse 63   Temp 97 °F (36.1 °C) (Temporal)   Resp 16   Ht 180.3 cm (5' 11\")   Wt 90.7 kg   SpO2 100%   BMI 27.89 kg/m²     General:  Vitals as listed.  No acute distress   HEENT: Sclerae anicteric.  Conjunctivae show no pallor.  Oropharynx clear, mucous membranes moist   Lungs: good air exchange  Abdomen: Suprapubic fullness and tenderness  Extremities: no edema, normal peripheral pulses   Neuro: Alert oriented and nonfocal      ED Course     Labs Reviewed   RAINBOW DRAW LAVENDER   RAINBOW DRAW LIGHT GREEN   RAINBOW DRAW BLUE     ED COURSE and MDM     Sources of the medical history included the patient and his wife    I reviewed prior external notes including evaluation by his urologist, Dr. Aguero, on 11/26/2024    Given Toradol for pain    Urinary catheter/bladder irrigation revealed no blood.  Will apply leg bag.  Patient to take his antibiotic and follow-up with his urologist    I have discussed with the patient the results of testing, differential diagnosis, and treatment plan. They expressed clear understanding of these instructions and agrees to the plan provided.    Disposition and Plan     Clinical Impression:  1. Urinary retention    2. Cystitis         Disposition:  Discharge  1/5/2025  9:28 am    Follow-up:  Victor Manuel Aguero MD  [address] [place] [id number]  [phone]    Schedule an appointment as soon as possible for a visit in 2 day(s)  Schedule appointment as soon as possible        Medications Prescribed:  Discharge Medication List as of 1/5/2025  9:29 AM

## 2025-01-07 ENCOUNTER — HOSPITAL ENCOUNTER (EMERGENCY)
Facility: HOSPITAL | Age: 85
Discharge: HOME | End: 2025-01-08
Attending: EMERGENCY MEDICINE
Payer: MEDICARE

## 2025-01-07 DIAGNOSIS — R31.9 HEMATURIA, UNSPECIFIED TYPE: ICD-10-CM

## 2025-01-07 DIAGNOSIS — R33.9 URINARY RETENTION: Primary | ICD-10-CM

## 2025-01-07 PROCEDURE — 99284 EMERGENCY DEPT VISIT MOD MDM: CPT

## 2025-01-07 PROCEDURE — 51700 IRRIGATION OF BLADDER: CPT

## 2025-01-07 PROCEDURE — 51798 US URINE CAPACITY MEASURE: CPT

## 2025-01-08 VITALS
HEIGHT: 70.5 IN | HEART RATE: 67 BPM | RESPIRATION RATE: 16 BRPM | BODY MASS INDEX: 28.31 KG/M2 | TEMPERATURE: 97 F | WEIGHT: 200 LBS | SYSTOLIC BLOOD PRESSURE: 136 MMHG | DIASTOLIC BLOOD PRESSURE: 72 MMHG | OXYGEN SATURATION: 96 %

## 2025-01-08 LAB
BILIRUB UR QL CFM: NEGATIVE
GLUCOSE UR STRIP.AUTO-MCNC: 100 MG/DL
NITRITE UR QL STRIP.AUTO: NEGATIVE
PH UR STRIP.AUTO: 5.5 [PH] (ref 5–8)
PROT UR STRIP.AUTO-MCNC: >=300 MG/DL
RBC #/AREA URNS AUTO: >10 /HPF
RBC #/AREA URNS AUTO: >10 /HPF
SP GR UR STRIP.AUTO: 1.02 (ref 1–1.03)
UROBILINOGEN UR STRIP.AUTO-MCNC: 0.2 MG/DL
WBC #/AREA URNS AUTO: >50 /HPF
WBC #/AREA URNS AUTO: >50 /HPF

## 2025-01-08 PROCEDURE — 81001 URINALYSIS AUTO W/SCOPE: CPT | Performed by: EMERGENCY MEDICINE

## 2025-01-08 PROCEDURE — 81015 MICROSCOPIC EXAM OF URINE: CPT | Performed by: EMERGENCY MEDICINE

## 2025-01-08 PROCEDURE — 87086 URINE CULTURE/COLONY COUNT: CPT | Performed by: EMERGENCY MEDICINE

## 2025-01-08 RX ORDER — LIDOCAINE HYDROCHLORIDE 20 MG/ML
10 JELLY TOPICAL ONCE
Status: COMPLETED | OUTPATIENT
Start: 2025-01-08 | End: 2025-01-08

## 2025-01-08 NOTE — ED INITIAL ASSESSMENT (HPI)
Pt to ED for c/o urinary retention after stafford cath was removed at 1130 AM today at Urologist's office. Pt states he had some dribbling hematuria since and now having hypogastric pressure.

## 2025-01-08 NOTE — ED PROVIDER NOTES
Patient Seen in: [facility] Emergency Department      History     Chief Complaint   Patient presents with   • Urinary Retention   • Hematuria     Stated Complaint: Urinary Retention; Hematuria    Subjective:   HPI      Cannot urinate  3rd visit to ER recently with gross heaturia and unable to urinate  Cultures negative  Hx prostate cancer but not on blood thinners  Nyla haskins this AM  Now cannot urinate again      Objective:     Past Medical History:   • Actinic keratosis - OSWALD Lee   • Cancer (HCC)    prostate   • Colon cancer screening [6/2/13] recheck 10 years [At that time, is risk > benefit ?] -  JUANJOSE Zee   • Diverticulosis of large intestine    prior doctor told patient, walls thin, possibility of rupture; careful having a colonoscopy   • Esophageal reflux   • H/O [ ~ ' 68 ] Rt sialadenitis [stone extraction]   • Hearing impairment   • High blood pressure   • High cholesterol   • Hypercholesterolemia   • Osteoarthritis   • Osteoarthritis of hip   • Other and unspecified personal history of malignant neoplasm    Prostate cancer.  [place] 3,3.  Pretreatment psa 4.4   • Reflux   • S/P [ ~ ' 75 ] vasectomy    • Visual impairment              Past Surgical History:   Procedure Laterality Date   • Colonoscopy N/A 1/14/2021    Procedure: COLONOSCOPY;  Surgeon: Michael Funes MD;  Location:  ENDOSCOPY   • Exc skin benig 1.1-2cm trunk,arm,leg N/A 10/16/2014    Procedure: EXCISION OF SEBACEOUS CYST;  Surgeon: Issac Gonzalez MD;  Location: [facility]   • Hip replacement surgery      right hip -2004 - dr merlos   • Other surgical history      excision of michelle scys on back- Dr. [id number]   • Patient documented not to have experienced any of the following events N/A 10/16/2014    Procedure: EXCISION OF SEBACEOUS CYST;  Surgeon: Issac Gonzalez MD;  Location: [facility]   • Patient withough preoperative order for iv antibiotic surgical site infection prophylaxis. N/A 10/16/2014     Procedure: EXCISION OF SEBACEOUS CYST;  Surgeon: Issac Gonzalez MD;  Location: [facility]   • Special service or report  Aug 2006    Brachytherapy of the prostate   • Tonsillectomy     • Total hip replacement Bilateral    • Total knee replacement Left                 Social History     Socioeconomic History   • Marital status:    Tobacco Use   • Smoking status: Former   • Smokeless tobacco: Never   • Tobacco comments:     quit cigarettes 2006   Vaping Use   • Vaping status: Never Used   Substance and Sexual Activity   • Alcohol use: Yes     Alcohol/week: 7.0 standard drinks of alcohol     Types: 7 Standard drinks or equivalent per week     Comment: SOCIAL.   • Drug use: No   Social History Narrative     - 1961. Wife - judit - is healthy - atrial fibrillation and patellar fracture. 2d- 1d- 51 kimberly  lutherkath- 3 sons- 18,20, 22 ; 1d- 47 riky - [place], [place]  and stepson -23. 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 Lives in [place] in [place]. Exercise - leg lifts. Works at [employer].      Social Drivers of Health      [facility]    Social Connections    Received from [facility]    Housing Stability                  Physical Exam     ED Triage Vitals [01/07/25 2350]   BP (!) 153/107   Pulse 97   Resp 20   Temp 97.4 °F (36.3 °C)   Temp src Temporal   SpO2 94 %   O2 Device None (Room air)       Current Vitals:   Vital Signs  BP: 125/64  Pulse: 69  Resp: 18  Temp: 97.4 °F (36.3 °C)  Temp src: Temporal  MAP (mmHg): 79    Oxygen Therapy  SpO2: 94 %  O2 Device: None (Room air)        Physical Exam  ***      ED Course     Labs Reviewed   URINALYSIS WITH CULTURE REFLEX - Abnormal; Notable for the following components:       Result Value    Urine Color Brown (*)     Clarity Urine Cloudy (*)     Glucose Urine 100 (*)     Ketones Urine Trace (*)      Blood Urine Large (*)     Protein Urine >=300 (*)     Leukocyte Esterase Urine Trace (*)     WBC Urine >50 (*)     RBC Urine >10 (*)     Bacteria Urine 1+ (*)     All other components within normal limits   UA MICROSCOPIC ONLY, URINE - Abnormal; Notable for the following components:    WBC Urine >50 (*)     RBC Urine >10 (*)     Bacteria Urine 1+ (*)     All other components within normal limits   ICTOTEST   URINE CULTURE, ROUTINE     Feeling improved after 3 way stafford placed- urine dark without clots- will irrigate 2 liters and home to decrease risk of stafford clogging  Leg bag and large bag for hoe - close follow up with Laci ALVARADO Course as of 01/08/25 0134  ------------------------------------------------------------  Time: 01/08 0132  Value: Leukocyte Esterase (!): Trace  Comment: (Reviewed)  ------------------------------------------------------------  Time: 01/08 0132  Value: RBC Urine(!): >10  Comment: (Reviewed)  ------------------------------------------------------------  Time: 01/08 0132  Value: WBC Urine(!): >50  Comment: (Reviewed)  ------------------------------------------------------------  Time: 01/08 0132  Value: RBC Urine(!): >10  Comment: No nitrates- cultures have been negative               MDM      ***        MDM    Disposition and Plan     Clinical Impression:  No diagnosis found.     Disposition:  There is no disposition on file for this visit.  There is no disposition time on file for this visit.    Follow-up:  No follow-up provider specified.        Medications Prescribed:  Current Discharge Medication List              Supplementary Documentation:                                                            hematuria.    Largely for Royal catheter placed, and in the ED for irrigation.  Patient was irrigated to flush out any clots.  We discussed home irrigation as well.  Encouraged him to drink lots of fluids follow-up if that should happen again but follow-up again with his urologist left in place a three-way Royal catheter        Medical Decision Making      Disposition and Plan     Clinical Impression:  1. Urinary retention    2. Hematuria, unspecified type         Disposition:  Discharge  1/8/2025  1:45 am    Follow-up:  Victor Manuel Aguero MD  [address] [place] [id number]  [phone]    Call  call in the morning          Medications Prescribed:  Discharge Medication List as of 1/8/2025  2:40 AM              Supplementary Documentation:

## 2025-02-05 ENCOUNTER — OFFICE VISIT (OUTPATIENT)
Dept: PODIATRY CLINIC | Facility: CLINIC | Age: 85
End: 2025-02-05
Payer: MEDICARE

## 2025-02-05 DIAGNOSIS — M79.671 PAIN IN BOTH FEET: ICD-10-CM

## 2025-02-05 DIAGNOSIS — B35.1 ONYCHOMYCOSIS: Primary | ICD-10-CM

## 2025-02-05 DIAGNOSIS — M77.41 METATARSALGIA OF BOTH FEET: ICD-10-CM

## 2025-02-05 DIAGNOSIS — L84 PRE-ULCERATIVE CALLUSES: ICD-10-CM

## 2025-02-05 DIAGNOSIS — M54.50 LEFT-SIDED LOW BACK PAIN WITHOUT SCIATICA, UNSPECIFIED CHRONICITY: ICD-10-CM

## 2025-02-05 DIAGNOSIS — L84 HELOMA MOLLE: ICD-10-CM

## 2025-02-05 DIAGNOSIS — M77.42 METATARSALGIA OF BOTH FEET: ICD-10-CM

## 2025-02-05 DIAGNOSIS — M79.672 PAIN IN BOTH FEET: ICD-10-CM

## 2025-02-05 DIAGNOSIS — M20.11 VALGUS DEFORMITY OF BOTH GREAT TOES: ICD-10-CM

## 2025-02-05 DIAGNOSIS — M21.622 TAILOR'S BUNION OF BOTH FEET: ICD-10-CM

## 2025-02-05 DIAGNOSIS — L60.0 ONYCHOCRYPTOSIS: ICD-10-CM

## 2025-02-05 DIAGNOSIS — M21.621 TAILOR'S BUNION OF BOTH FEET: ICD-10-CM

## 2025-02-05 DIAGNOSIS — M20.12 VALGUS DEFORMITY OF BOTH GREAT TOES: ICD-10-CM

## 2025-02-05 PROCEDURE — 99213 OFFICE O/P EST LOW 20 MIN: CPT | Performed by: STUDENT IN AN ORGANIZED HEALTH CARE EDUCATION/TRAINING PROGRAM

## 2025-02-05 NOTE — PROGRESS NOTES
[facility] Podiatry  Progress Note    Andrzej Morley is a 84 year old male.   Chief Complaint   Patient presents with    Toenail Care     2 mo f/u - LOV with PCP Dr Varela was  in about 1 year - needs his toenails trimmed and calluses shaved - has moderate pain with walking barefoot          HPI:     Patient is a pleasant 84-year-old male who returns to clinic for evaluation of elongated nails and thickened calluses he has difficulty trimming his own.  He gets recurrent callus between his left fourth and fifth digits that is doing better since he has been using padding to offload.  He has been managing this fairly well with silicone toe pad.  He denies any open sores to his feet or other concerns.  Past medical history, medications, and allergies reviewed.      Allergies: Patient has no known allergies.   Current Outpatient Medications   Medication Sig Dispense Refill    famotidine 20 MG Oral Tab Take 1 tablet (20 mg total) by mouth 2 (two) times daily.      polypropylene glycol- 0.4-0.3 % Ophthalmic Solution Place 1 drop into both eyes as needed.      silodosin 8 MG Oral Cap Take 1 capsule (8 mg total) by mouth every evening.      amLODIPine 5 MG Oral Tab Take 1 tablet (5 mg total) by mouth at bedtime.      Apoaequorin (PREVAGEN OR) Take by mouth daily.      ATORVASTATIN 80 MG Oral Tab TAKE 1 TABLET BY MOUTH EVERY DAY IN THE EVENING 90 tablet 0    naproxen 500 MG Oral Tab Take 1 tablet (500 mg total) by mouth as needed.      Simethicone 180 MG Oral Cap Take by mouth as needed.      psyllium 28 % Oral Powd Pack Take 1 packet by mouth daily.      Cholecalciferol (VITAMIN D) 1000 units Oral Tab Take 100 Units by mouth daily.        Past Medical History:    Actinic keratosis - OSWALD Lee    Cancer (HCC)    prostate    Colon cancer screening [6/2/13] recheck 10 years [At that time, is risk > benefit ?] -  JUANJOSE Zee    Diverticulosis of large intestine    prior doctor told patient, walls thin, possibility of  rupture; careful having a colonoscopy    Esophageal reflux    H/O [ ~ ' 68 ] Rt sialadenitis [stone extraction]    Hearing impairment    High blood pressure    High cholesterol    Hypercholesterolemia    Osteoarthritis    Osteoarthritis of hip    Other and unspecified personal history of malignant neoplasm    Prostate cancer.  [place] 3,3.  Pretreatment psa 4.4    Reflux    S/P [ ~ ' 75 ] vasectomy     Visual impairment      Past Surgical History:   Procedure Laterality Date    Colonoscopy N/A 1/14/2021    Procedure: COLONOSCOPY;  Surgeon: Michael Funes MD;  Location:  ENDOSCOPY    Exc skin benig 1.1-2cm trunk,arm,leg N/A 10/16/2014    Procedure: EXCISION OF SEBACEOUS CYST;  Surgeon: Issac Gonzalez MD;  Location: [facility]    Hip replacement surgery      right hip -2004 - dr merlos    Other surgical history      excision of michelle scys on back- Dr. [id number]    Patient documented not to have experienced any of the following events N/A 10/16/2014    Procedure: EXCISION OF SEBACEOUS CYST;  Surgeon: Issac Gonzalez MD;  Location: [facility]    Patient withough preoperative order for iv antibiotic surgical site infection prophylaxis. N/A 10/16/2014    Procedure: EXCISION OF SEBACEOUS CYST;  Surgeon: Issac Gonzalez MD;  Location: [facility]    Special service or report  Aug 2006    Brachytherapy of the prostate    Tonsillectomy      Total hip replacement Bilateral     Total knee replacement Left       Family History   Problem Relation Age of Onset    Pulmonary Disease Mother         copd    Heart Disease Sister         atrial fibrillation    Heart Attack Father       Social History     Socioeconomic History    Marital status:    Tobacco Use    Smoking status: Former    Smokeless tobacco: Never    Tobacco comments:     quit cigarettes 2006   Vaping Use    Vaping status: Never Used   Substance and Sexual Activity    Alcohol use: Yes     Alcohol/week: 7.0 standard drinks of alcohol     Types: 7  Standard drinks or equivalent per week     Comment: SOCIAL.    Drug use: No           REVIEW OF SYSTEMS:     Today reviewed systems as documented below  GENERAL HEALTH: feels well otherwise  SKIN: denies any unusual skin lesions or rashes  RESPIRATORY: denies shortness of breath with exertion  CARDIOVASCULAR: denies chest pain on exertion  GI: denies abdominal pain and denies heartburn  NEURO: denies headaches  MUSCULO: History of arthritis      EXAM:   There were no vitals taken for this visit.  GENERAL: well developed, well nourished, in no apparent distress  EXTREMITIES:   1. Integument: Normal skin temperature and turgor.  Nails x10 are elongated, thickened, dystrophic, subungual debris.  Hallux nails are incurvated bilaterally.   Very minimal hyperkeratotic lesion noted between fourth and fifth digits of left foot consistent with heloma molle.   No open lesions or acute signs of infection are noted.  2. Vascular: Dorsalis pedis two out of four bilateral and posterior tibial pulses two out of    four bilateral, capillary refill normal.   3. Musculoskeletal: All muscle groups are graded 5 out of 5 in the foot and ankle.  Decreased plantar fat pad noted bilaterally.  HAV deformity noted bilaterally with tailor bunion deformity noted bilaterally.  Prominent metatarsal heads noted bilaterally.   4. Neurological: Normal sharp dull sensation; reflexes normal.          ASSESSMENT AND PLAN:   Diagnoses and all orders for this visit:    Onychomycosis    Onychocryptosis    Metatarsalgia of both feet    Heloma molle    Tailor's bunion of both feet    Valgus deformity of both great toes    Left-sided low back pain without sciatica, unspecified chronicity    Pain in both feet    Pre-ulcerative calluses              Plan:   -Patient examined, chart history reviewed.  -Discussed importance of proper pedal hygiene and regular foot checks.  -Sharply debrided nails x10 with a sterile nail nipper achieving a 20% reduction in  thickness and length, without incident. Nails further smoothed with dremel. Pinpoint bleeding noted that was dressed with bacitracin.  -Pared HPKs x 1 with #15 blade healthy tissue without incident.  -Patient can use gauze or toe spacer between fourth and fifth digits of left foot to prevent recurrence of heloma molle.  -Patient can continue using Aquaphor to moisturize sites on his own.   -If symptoms worsen or fail to improve can consider surgical intervention.  -Educated patient on acute signs of infection advised patient to seek immediate medical attention if symptoms arise.    The patient indicates understanding of these issues and agrees to the plan.    RTC 2 months.    Jorgito Garcia DPM

## 2025-02-16 ENCOUNTER — HOSPITAL ENCOUNTER (EMERGENCY)
Facility: HOSPITAL | Age: 85
Discharge: HOME | End: 2025-02-16
Attending: EMERGENCY MEDICINE
Payer: MEDICARE

## 2025-02-16 VITALS
RESPIRATION RATE: 19 BRPM | HEIGHT: 70 IN | WEIGHT: 200 LBS | SYSTOLIC BLOOD PRESSURE: 140 MMHG | TEMPERATURE: 98 F | DIASTOLIC BLOOD PRESSURE: 109 MMHG | HEART RATE: 87 BPM | BODY MASS INDEX: 28.63 KG/M2 | OXYGEN SATURATION: 100 %

## 2025-02-16 DIAGNOSIS — R33.9 URINARY RETENTION: Primary | ICD-10-CM

## 2025-02-16 LAB
BILIRUB UR QL STRIP.AUTO: NEGATIVE
GLUCOSE UR STRIP.AUTO-MCNC: NORMAL MG/DL
LEUKOCYTE ESTERASE UR QL STRIP.AUTO: 25
NITRITE UR QL STRIP.AUTO: NEGATIVE
PH UR STRIP.AUTO: 6 [PH] (ref 5–8)
PROT UR STRIP.AUTO-MCNC: 200 MG/DL
RBC #/AREA URNS AUTO: >10 /HPF
SP GR UR STRIP.AUTO: 1.02 (ref 1–1.03)
UROBILINOGEN UR STRIP.AUTO-MCNC: NORMAL MG/DL
WBC #/AREA URNS AUTO: >50 /HPF

## 2025-02-16 PROCEDURE — 99284 EMERGENCY DEPT VISIT MOD MDM: CPT

## 2025-02-16 PROCEDURE — 87086 URINE CULTURE/COLONY COUNT: CPT | Performed by: EMERGENCY MEDICINE

## 2025-02-16 PROCEDURE — 51798 US URINE CAPACITY MEASURE: CPT

## 2025-02-16 PROCEDURE — 99285 EMERGENCY DEPT VISIT HI MDM: CPT

## 2025-02-16 PROCEDURE — 51700 IRRIGATION OF BLADDER: CPT

## 2025-02-16 PROCEDURE — 81001 URINALYSIS AUTO W/SCOPE: CPT | Performed by: EMERGENCY MEDICINE

## 2025-02-16 RX ORDER — LIDOCAINE HYDROCHLORIDE 20 MG/ML
JELLY TOPICAL
Status: COMPLETED
Start: 2025-02-16 | End: 2025-02-16

## 2025-02-16 RX ORDER — LIDOCAINE HYDROCHLORIDE 20 MG/ML
10 JELLY TOPICAL ONCE
Status: COMPLETED | OUTPATIENT
Start: 2025-02-16 | End: 2025-02-16

## 2025-02-16 RX ORDER — HYDROCODONE BITARTRATE AND ACETAMINOPHEN 5; 325 MG/1; MG/1
1-2 TABLET ORAL EVERY 6 HOURS PRN
Qty: 10 TABLET | Refills: 0 | Status: SHIPPED | OUTPATIENT
Start: 2025-02-16 | End: 2025-02-21

## 2025-02-16 RX ORDER — MAGNESIUM HYDROXIDE 1200 MG/15ML
3000 LIQUID ORAL CONTINUOUS
Status: DISCONTINUED | OUTPATIENT
Start: 2025-02-16 | End: 2025-02-16

## 2025-02-16 RX ORDER — HYDROCODONE BITARTRATE AND ACETAMINOPHEN 5; 325 MG/1; MG/1
1 TABLET ORAL ONCE
Status: COMPLETED | OUTPATIENT
Start: 2025-02-16 | End: 2025-02-16

## 2025-02-16 NOTE — ED INITIAL ASSESSMENT (HPI)
Pt ambulated into the ED with c/o \"unable to urinate and when I do, its only blood.\" Pt states s/sx started around 0130. Pt has hx of needing bladder irrigation.

## 2025-02-16 NOTE — ED PROVIDER NOTES
Patient Seen in: [facility] Emergency Department      History     Chief Complaint   Patient presents with    Urinary Symptoms     Stated Complaint: pt states unable to urinate occasionally only blood will comes out    Subjective:   HPI      84-year-old male presents for evaluation ration of inability to urinate since about 1 AM.  The patient feels really uncomfortable.  He was able to get out a few blood clots from the urethra when symptoms started.  Has a long history of hematuria and urinary retention.  Last Royal catheter was placed by then removed about a month ago.    Objective:     Past Medical History:    Actinic keratosis - OSWADL Lee    Cancer (HCC)    prostate    Colon cancer screening [6/2/13] recheck 10 years [At that time, is risk > benefit ?] -  JUANJOSE Zee    Diverticulosis of large intestine    prior doctor told patient, walls thin, possibility of rupture; careful having a colonoscopy    Esophageal reflux    H/O [ ~ ' 68 ] Rt sialadenitis [stone extraction]    Hearing impairment    High blood pressure    High cholesterol    Hypercholesterolemia    Osteoarthritis    Osteoarthritis of hip    Other and unspecified personal history of malignant neoplasm    Prostate cancer.  [place] 3,3.  Pretreatment psa 4.4    Reflux    S/P [ ~ ' 75 ] vasectomy     Visual impairment              Past Surgical History:   Procedure Laterality Date    Colonoscopy N/A 1/14/2021    Procedure: COLONOSCOPY;  Surgeon: Michael Funes MD;  Location:  ENDOSCOPY    Exc skin benig 1.1-2cm trunk,arm,leg N/A 10/16/2014    Procedure: EXCISION OF SEBACEOUS CYST;  Surgeon: Issac Gonzalez MD;  Location: [facility]    Hip replacement surgery      right hip -2004 - dr merlos    Other surgical history      excision of michelle scys on back- Dr. [id number]    Patient documented not to have experienced any of the following events N/A 10/16/2014    Procedure: EXCISION OF SEBACEOUS CYST;  Surgeon: Issac Gonzalez MD;  Location:  [facility]    Patient withough preoperative order for iv antibiotic surgical site infection prophylaxis. N/A 10/16/2014    Procedure: EXCISION OF SEBACEOUS CYST;  Surgeon: Issac Gonzalez MD;  Location: [facility]    Special service or report  Aug 2006    Brachytherapy of the prostate    Tonsillectomy      Total hip replacement Bilateral     Total knee replacement Left                 Social History     Socioeconomic History    Marital status:    Tobacco Use    Smoking status: Former    Smokeless tobacco: Never    Tobacco comments:     quit cigarettes 2006   Vaping Use    Vaping status: Never Used   Substance and Sexual Activity    Alcohol use: Yes     Alcohol/week: 7.0 standard drinks of alcohol     Types: 7 Standard drinks or equivalent per week     Comment: SOCIAL.    Drug use: No   Social History Narrative     - 1961. Wife - jan - is healthy - atrial fibrillation and patellar fracture. 2d- 1d- 51 kimberly - joie- 3 sons- 18,20, 22 ; 1d- 47 riky - [place], [place]  and stepson -23. 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 Lives in [facility]. Exercise - leg lifts. Works at [employer].      Social Drivers of Health      [facility]    Housing Stability                  Physical Exam     ED Triage Vitals   BP 02/16/25 0500 (!) 163/83   Pulse 02/16/25 0455 84   Resp 02/16/25 0455 18   Temp 02/16/25 0455 97.7 °F (36.5 °C)   Temp src --    SpO2 02/16/25 0455 100 %   O2 Device 02/16/25 0600 None (Room air)       Current Vitals:   Vital Signs  BP: 138/75  Pulse: 59  Resp: 11  Temp: 97.7 °F (36.5 °C)  MAP (mmHg): 94    Oxygen Therapy  SpO2: 99 %  O2 Device: None (Room air)        Physical Exam     General: Alert, oriented, uncomfortable  Neck: Supple  Lungs: Clear to auscultation bilaterally.  Heart: Regular rate and rhythm.  Abdomen: Soft, suprapubic distension  Skin: No rash.  No  edema.  Neurologic: No focal neurologic deficits.  Normal speech pattern  Musculoskeletal: No tenderness or deformity noted.  Full range of motion throughout.      ED Course     Labs Reviewed   URINALYSIS WITH CULTURE REFLEX - Abnormal; Notable for the following components:       Result Value    Clarity Urine Turbid (*)     Ketones Urine Trace (*)     Blood Urine 3+ (*)     Protein Urine 200 (*)     Leukocyte Esterase Urine 25 (*)     WBC Urine >50 (*)     RBC Urine >10 (*)     Bacteria Urine Rare (*)     All other components within normal limits   URINE CULTURE, ROUTINE                   MDM      Differential diagnosis includes urinary retention, UTI,     Bladder scan with 400 cc.    Royal catheter placed and CBI started.    Patient's urine turned from a dark red to a xochilt color.  He felt much better.    Medical Decision Making  Amount and/or Complexity of Data Reviewed  Independent Historian: caregiver     Details: Wife at bedside  External Data Reviewed: notes.     Details: Last ER visit for same was 1/7/2025    UA does not strongly suggest UTI.  Cultures pending.    Disposition and Plan     Clinical Impression:  1. Urinary retention         Disposition:  Discharge  2/16/2025  8:58 am    Follow-up:  Victor Manuel Aguero MD  [address] [place] [id number]  [phone]    Call            Medications Prescribed:  Current Discharge Medication List              Supplementary Documentation:

## 2025-03-11 ENCOUNTER — HOSPITAL ENCOUNTER (EMERGENCY)
Facility: HOSPITAL | Age: 85
Discharge: HOME | End: 2025-03-11
Attending: STUDENT IN AN ORGANIZED HEALTH CARE EDUCATION/TRAINING PROGRAM
Payer: MEDICARE

## 2025-03-11 VITALS
OXYGEN SATURATION: 99 % | BODY MASS INDEX: 28 KG/M2 | WEIGHT: 200 LBS | TEMPERATURE: 97 F | HEART RATE: 65 BPM | RESPIRATION RATE: 16 BRPM | SYSTOLIC BLOOD PRESSURE: 131 MMHG | DIASTOLIC BLOOD PRESSURE: 65 MMHG | HEIGHT: 71 IN

## 2025-03-11 DIAGNOSIS — N40.1 BPH LOC W URIN OBS/LUTS: ICD-10-CM

## 2025-03-11 DIAGNOSIS — T83.021A DISPLACEMENT OF FOLEY CATHETER, INITIAL ENCOUNTER: Primary | ICD-10-CM

## 2025-03-11 PROCEDURE — 99283 EMERGENCY DEPT VISIT LOW MDM: CPT

## 2025-03-11 PROCEDURE — 51702 INSERT TEMP BLADDER CATH: CPT

## 2025-03-11 RX ORDER — LIDOCAINE HYDROCHLORIDE 20 MG/ML
10 JELLY TOPICAL ONCE
Status: COMPLETED | OUTPATIENT
Start: 2025-03-11 | End: 2025-03-11

## 2025-03-11 NOTE — ED INITIAL ASSESSMENT (HPI)
Pt presents to ER after his indwelling catheter fell out this morning. The catheter had been in for a couple of weeks. Pt has the catheter prior to surgery to fix scar tissue around urethra.

## 2025-03-11 NOTE — ED PROVIDER NOTES
History     Chief Complaint   Patient presents with    Cath Tube Problem     Fell out       HPI    84 year old male with history of BPH, LUTS, prostatic stricture and membranous stricture with indwelling Royal presents with dislodged Royal catheter.  Patient had the bag on and urinated normal amount last night, he noted this morning while in the toilet having a bowel movement that the tube had fallen out.  No trauma.  He has no discomfort at this time.  Planning to have urethral dilation surgery at the end of March with urology.          Past Medical History:    Actinic keratosis - OSWALD Lee    Cancer (HCC)    prostate    Colon cancer screening [6/2/13] recheck 10 years [At that time, is risk > benefit ?] -  JUANJOSE Zee    Diverticulosis of large intestine    prior doctor told patient, walls thin, possibility of rupture; careful having a colonoscopy    Esophageal reflux    H/O [ ~ ' 68 ] Rt sialadenitis [stone extraction]    Hearing impairment    High blood pressure    High cholesterol    Hypercholesterolemia    Osteoarthritis    Osteoarthritis of hip    Other and unspecified personal history of malignant neoplasm    Prostate cancer.  [place] 3,3.  Pretreatment psa 4.4    Reflux    S/P [ ~ ' 75 ] vasectomy     Visual impairment       Past Surgical History:   Procedure Laterality Date    Colonoscopy N/A 1/14/2021    Procedure: COLONOSCOPY;  Surgeon: Michael Funes MD;  Location:  ENDOSCOPY    Exc skin benig 1.1-2cm trunk,arm,leg N/A 10/16/2014    Procedure: EXCISION OF SEBACEOUS CYST;  Surgeon: Issac Gonzalez MD;  Location: [facility]    Hip replacement surgery      right hip -2004 - dr merlos    Other surgical history      excision of michelle scys on back- Dr. GonzalezMqrbodb-51-57    Patient documented not to have experienced any of the following events N/A 10/16/2014    Procedure: EXCISION OF SEBACEOUS CYST;  Surgeon: Issac Gonzalez MD;  Location: [facility]    Patient withough preoperative order for iv  antibiotic surgical site infection prophylaxis. N/A 10/16/2014    Procedure: EXCISION OF SEBACEOUS CYST;  Surgeon: Issac Gonzalez MD;  Location: [facility]    Special service or report  Aug 2006    Brachytherapy of the prostate    Tonsillectomy      Total hip replacement Bilateral     Total knee replacement Left        Social History     Socioeconomic History    Marital status:    Tobacco Use    Smoking status: Former    Smokeless tobacco: Never    Tobacco comments:     quit cigarettes 2006   Vaping Use    Vaping status: Never Used   Substance and Sexual Activity    Alcohol use: Yes     Alcohol/week: 7.0 standard drinks of alcohol     Types: 7 Standard drinks or equivalent per week     Comment: SOCIAL.    Drug use: No   Social History Narrative     - 1961. Wife - jan - is healthy - atrial fibrillation and patellar fracture. 2d- 1d- 51 kimberly  joie- 3 sons- 18,20, 22 ; 1d- 47 riky - [place], [place]  and stepson -23. Retired - 1997 [employer]- [employer]. Lives in [facility]. Exercise - leg lifts. Works at [employer].      Social Drivers of Health      [facility]    Housing Stability                   Physical Exam     ED Triage Vitals [03/11/25 0724]   /78   Pulse 76   Resp 18   Temp 96.7 °F (35.9 °C)   Temp src Temporal   SpO2 96 %   O2 Device None (Room air)       Physical Exam  Constitutional:       General: He is not in acute distress.  Eyes:      Extraocular Movements: Extraocular movements intact.   Cardiovascular:      Rate and Rhythm: Normal rate.      Pulses: Normal pulses.   Pulmonary:      Effort: Pulmonary effort is normal. No respiratory distress.   Abdominal:      General: Abdomen is flat. There is no distension.      Tenderness: There is no abdominal tenderness. There is no guarding.   Genitourinary:     Comments: No external abnormalities  noted  Musculoskeletal:      Cervical back: Normal range of motion.   Neurological:      General: No focal deficit present.      Mental Status: He is alert.              ED Course     Labs Reviewed - No data to display  No results found.        [facility]     Vitals:    03/11/25 0724   BP: 132/78   Pulse: 76   Resp: 18   Temp: 96.7 °F (35.9 °C)   TempSrc: Temporal   SpO2: 96%   Weight: 90.7 kg   Height: 180.3 cm (5' 11\")       Dislodged Royal catheter in the setting of strictures, BPH, LUTS.  No voiding since Royal removal.  Royal catheter replaced successfully at bedside without complications.  Patient will be discharged with urology follow-up.  Return precautions.           Disposition and Plan     Clinical Impression:  1. Displacement of Royal catheter, initial encounter    2. BPH loc w urin obs/LUTS        Disposition:  Discharge    Follow-up:  Victor Manuel Aguero MD  [address] [address]  [phone]    Follow up        Medications Prescribed:  Current Discharge Medication List

## 2025-03-26 RX ORDER — TRANEXAMIC ACID 650 MG/1
1300 TABLET ORAL 4 TIMES DAILY
COMMUNITY

## 2025-03-26 RX ORDER — HYDROCODONE BITARTRATE AND ACETAMINOPHEN 5; 325 MG/1; MG/1
1 TABLET ORAL EVERY 6 HOURS PRN
COMMUNITY

## 2025-03-26 RX ORDER — ATORVASTATIN CALCIUM 80 MG/1
80 TABLET, FILM COATED ORAL DAILY
COMMUNITY

## 2025-03-26 RX ORDER — AMLODIPINE BESYLATE 5 MG/1
5 TABLET ORAL AT BEDTIME
COMMUNITY

## 2025-03-26 RX ORDER — TAMSULOSIN HYDROCHLORIDE 0.4 MG/1
0.4 CAPSULE ORAL NIGHTLY
COMMUNITY

## 2025-03-26 RX ORDER — AMINOCAPROIC ACID 500 MG/1
1 TABLET ORAL EVERY 6 HOURS PRN
COMMUNITY

## 2025-03-26 RX ORDER — FAMOTIDINE 20 MG/1
20 TABLET, FILM COATED ORAL 2 TIMES DAILY
COMMUNITY

## 2025-03-26 RX ORDER — DESMOPRESSIN ACETATE 0.1 MG/1
0.1 TABLET ORAL NIGHTLY
COMMUNITY

## 2025-03-26 ASSESSMENT — ACTIVITIES OF DAILY LIVING (ADL)
ADL_BEFORE_ADMISSION: INDEPENDENT
SENSORY_SUPPORT_DEVICES: HEARING AIDS;EYEGLASSES
ADL_SCORE: 12

## 2025-03-28 ENCOUNTER — HOSPITAL ENCOUNTER (OUTPATIENT)
Age: 85
Discharge: HOME | End: 2025-03-28
Attending: UROLOGY | Admitting: UROLOGY

## 2025-03-28 ENCOUNTER — ANESTHESIA EVENT (OUTPATIENT)
Dept: SURGERY | Age: 85
End: 2025-03-28

## 2025-03-28 ENCOUNTER — APPOINTMENT (OUTPATIENT)
Dept: GENERAL RADIOLOGY | Age: 85
End: 2025-03-28
Attending: UROLOGY

## 2025-03-28 ENCOUNTER — ANESTHESIA (OUTPATIENT)
Dept: SURGERY | Age: 85
End: 2025-03-28

## 2025-03-28 PROCEDURE — 10005281 FL INTRAOPERATIVE C ARM WITH REPORT

## 2025-03-28 PROCEDURE — 13000003 HB ANESTHESIA  GENERAL EA ADD MINUTE: Performed by: UROLOGY

## 2025-03-28 PROCEDURE — 10002800 HB RX 250 W HCPCS: Performed by: GENERAL ACUTE CARE HOSPITAL

## 2025-03-28 PROCEDURE — 10002803 HB RX 637

## 2025-03-28 PROCEDURE — 10002807 HB RX 258: Performed by: GENERAL ACUTE CARE HOSPITAL

## 2025-03-28 PROCEDURE — 13000037 HB COMPLEX CASE EACH ADD MINUTE: Performed by: UROLOGY

## 2025-03-28 PROCEDURE — 13000036 HB COMPLEX  CASE S/U + 1ST 15 MIN: Performed by: UROLOGY

## 2025-03-28 PROCEDURE — 10002801 HB RX 250 W/O HCPCS: Performed by: GENERAL ACUTE CARE HOSPITAL

## 2025-03-28 PROCEDURE — 10006023 HB SUPPLY 272: Performed by: UROLOGY

## 2025-03-28 PROCEDURE — C1769 GUIDE WIRE: HCPCS | Performed by: UROLOGY

## 2025-03-28 PROCEDURE — 13000001 HB PHASE II RECOVERY EA 30 MINUTES: Performed by: UROLOGY

## 2025-03-28 PROCEDURE — C1726 CATH, BAL DIL, NON-VASCULAR: HCPCS | Performed by: UROLOGY

## 2025-03-28 PROCEDURE — 10002805 HB CONTRAST AGENT: Performed by: UROLOGY

## 2025-03-28 PROCEDURE — 10002800 HB RX 250 W HCPCS: Performed by: UROLOGY

## 2025-03-28 PROCEDURE — 13000002 HB ANESTHESIA  GENERAL   S/U + 1ST 15 MIN: Performed by: UROLOGY

## 2025-03-28 PROCEDURE — 10004451 HB PACU RECOVERY 1ST 30 MINUTES: Performed by: UROLOGY

## 2025-03-28 PROCEDURE — 10002801 HB RX 250 W/O HCPCS: Performed by: UROLOGY

## 2025-03-28 PROCEDURE — 88341 IMHCHEM/IMCYTCHM EA ADD ANTB: CPT | Performed by: UROLOGY

## 2025-03-28 RX ORDER — HYDROCODONE BITARTRATE AND ACETAMINOPHEN 5; 325 MG/1; MG/1
1 TABLET ORAL
Status: DISCONTINUED | OUTPATIENT
Start: 2025-03-28 | End: 2025-03-28 | Stop reason: HOSPADM

## 2025-03-28 RX ORDER — PROPOFOL 10 MG/ML
INJECTION, EMULSION INTRAVENOUS PRN
Status: DISCONTINUED | OUTPATIENT
Start: 2025-03-28 | End: 2025-03-28

## 2025-03-28 RX ORDER — HYDRALAZINE HYDROCHLORIDE 20 MG/ML
5 INJECTION INTRAMUSCULAR; INTRAVENOUS EVERY 10 MIN PRN
Status: DISCONTINUED | OUTPATIENT
Start: 2025-03-28 | End: 2025-03-28 | Stop reason: HOSPADM

## 2025-03-28 RX ORDER — ONDANSETRON 2 MG/ML
INJECTION INTRAMUSCULAR; INTRAVENOUS PRN
Status: DISCONTINUED | OUTPATIENT
Start: 2025-03-28 | End: 2025-03-28

## 2025-03-28 RX ORDER — SODIUM CHLORIDE, SODIUM LACTATE, POTASSIUM CHLORIDE, CALCIUM CHLORIDE 600; 310; 30; 20 MG/100ML; MG/100ML; MG/100ML; MG/100ML
INJECTION, SOLUTION INTRAVENOUS CONTINUOUS
Status: DISCONTINUED | OUTPATIENT
Start: 2025-03-28 | End: 2025-03-28 | Stop reason: HOSPADM

## 2025-03-28 RX ORDER — PROCHLORPERAZINE EDISYLATE 5 MG/ML
5 INJECTION INTRAMUSCULAR; INTRAVENOUS EVERY 4 HOURS PRN
Status: DISCONTINUED | OUTPATIENT
Start: 2025-03-28 | End: 2025-03-28 | Stop reason: HOSPADM

## 2025-03-28 RX ORDER — SCOPOLAMINE 1 MG/3D
1 PATCH, EXTENDED RELEASE TRANSDERMAL
Status: DISCONTINUED | OUTPATIENT
Start: 2025-03-28 | End: 2025-03-28 | Stop reason: HOSPADM

## 2025-03-28 RX ORDER — DEXAMETHASONE SODIUM PHOSPHATE 4 MG/ML
4 INJECTION, SOLUTION INTRA-ARTICULAR; INTRALESIONAL; INTRAMUSCULAR; INTRAVENOUS; SOFT TISSUE
Status: DISCONTINUED | OUTPATIENT
Start: 2025-03-28 | End: 2025-03-28 | Stop reason: HOSPADM

## 2025-03-28 RX ORDER — MIDAZOLAM HYDROCHLORIDE 1 MG/ML
2 INJECTION, SOLUTION INTRAMUSCULAR; INTRAVENOUS
Status: DISCONTINUED | OUTPATIENT
Start: 2025-03-28 | End: 2025-03-28 | Stop reason: HOSPADM

## 2025-03-28 RX ORDER — NICOTINE POLACRILEX 4 MG
30 LOZENGE BUCCAL
Status: DISCONTINUED | OUTPATIENT
Start: 2025-03-28 | End: 2025-03-28 | Stop reason: HOSPADM

## 2025-03-28 RX ORDER — DEXAMETHASONE SODIUM PHOSPHATE 4 MG/ML
INJECTION, SOLUTION INTRA-ARTICULAR; INTRALESIONAL; INTRAMUSCULAR; INTRAVENOUS; SOFT TISSUE PRN
Status: DISCONTINUED | OUTPATIENT
Start: 2025-03-28 | End: 2025-03-28

## 2025-03-28 RX ORDER — METOPROLOL SUCCINATE 25 MG/1
25 TABLET, EXTENDED RELEASE ORAL
Status: DISCONTINUED | OUTPATIENT
Start: 2025-03-28 | End: 2025-03-28 | Stop reason: HOSPADM

## 2025-03-28 RX ORDER — DEXTROSE MONOHYDRATE 25 G/50ML
25 INJECTION, SOLUTION INTRAVENOUS PRN
Status: DISCONTINUED | OUTPATIENT
Start: 2025-03-28 | End: 2025-03-28 | Stop reason: HOSPADM

## 2025-03-28 RX ORDER — HYDROCODONE BITARTRATE AND ACETAMINOPHEN 5; 325 MG/1; MG/1
1 TABLET ORAL EVERY 4 HOURS PRN
Status: DISCONTINUED | OUTPATIENT
Start: 2025-03-28 | End: 2025-03-28 | Stop reason: HOSPADM

## 2025-03-28 RX ORDER — LIDOCAINE HYDROCHLORIDE 20 MG/ML
INJECTION, SOLUTION INFILTRATION; PERINEURAL PRN
Status: DISCONTINUED | OUTPATIENT
Start: 2025-03-28 | End: 2025-03-28

## 2025-03-28 RX ORDER — 0.9 % SODIUM CHLORIDE 0.9 %
10 VIAL (ML) INJECTION PRN
Status: DISCONTINUED | OUTPATIENT
Start: 2025-03-28 | End: 2025-03-28 | Stop reason: HOSPADM

## 2025-03-28 RX ORDER — SODIUM CHLORIDE 9 MG/ML
INJECTION, SOLUTION INTRAVENOUS CONTINUOUS
Status: DISCONTINUED | OUTPATIENT
Start: 2025-03-28 | End: 2025-03-28 | Stop reason: HOSPADM

## 2025-03-28 RX ORDER — ACETAMINOPHEN 325 MG/1
650 TABLET ORAL
Status: DISCONTINUED | OUTPATIENT
Start: 2025-03-28 | End: 2025-03-28 | Stop reason: HOSPADM

## 2025-03-28 RX ORDER — ONDANSETRON 4 MG/1
4 TABLET, ORALLY DISINTEGRATING ORAL
Status: DISCONTINUED | OUTPATIENT
Start: 2025-03-28 | End: 2025-03-28 | Stop reason: HOSPADM

## 2025-03-28 RX ORDER — HYDROCODONE BITARTRATE AND ACETAMINOPHEN 5; 325 MG/1; MG/1
TABLET ORAL
Status: COMPLETED
Start: 2025-03-28 | End: 2025-03-28

## 2025-03-28 RX ORDER — SULFAMETHOXAZOLE AND TRIMETHOPRIM 800; 160 MG/1; MG/1
1 TABLET ORAL 2 TIMES DAILY
Qty: 10 TABLET | Refills: 0 | Status: SHIPPED | OUTPATIENT
Start: 2025-03-28 | End: 2025-04-02

## 2025-03-28 RX ORDER — METOCLOPRAMIDE HYDROCHLORIDE 5 MG/ML
5 INJECTION INTRAMUSCULAR; INTRAVENOUS
Status: DISCONTINUED | OUTPATIENT
Start: 2025-03-28 | End: 2025-03-28 | Stop reason: HOSPADM

## 2025-03-28 RX ORDER — FAMOTIDINE 20 MG/1
20 TABLET, FILM COATED ORAL
Status: DISCONTINUED | OUTPATIENT
Start: 2025-03-28 | End: 2025-03-28 | Stop reason: HOSPADM

## 2025-03-28 RX ORDER — ONDANSETRON 2 MG/ML
4 INJECTION INTRAMUSCULAR; INTRAVENOUS
Status: DISCONTINUED | OUTPATIENT
Start: 2025-03-28 | End: 2025-03-28 | Stop reason: HOSPADM

## 2025-03-28 RX ORDER — ONDANSETRON 2 MG/ML
4 INJECTION INTRAMUSCULAR; INTRAVENOUS 2 TIMES DAILY PRN
Status: DISCONTINUED | OUTPATIENT
Start: 2025-03-28 | End: 2025-03-28 | Stop reason: HOSPADM

## 2025-03-28 RX ORDER — 0.9 % SODIUM CHLORIDE 0.9 %
2 VIAL (ML) INJECTION EVERY 12 HOURS SCHEDULED
Status: DISCONTINUED | OUTPATIENT
Start: 2025-03-28 | End: 2025-03-28 | Stop reason: HOSPADM

## 2025-03-28 RX ORDER — ALBUTEROL SULFATE 0.83 MG/ML
2.5 SOLUTION RESPIRATORY (INHALATION)
Status: DISCONTINUED | OUTPATIENT
Start: 2025-03-28 | End: 2025-03-28 | Stop reason: HOSPADM

## 2025-03-28 RX ORDER — METOCLOPRAMIDE HYDROCHLORIDE 5 MG/ML
5 INJECTION INTRAMUSCULAR; INTRAVENOUS EVERY 6 HOURS PRN
Status: DISCONTINUED | OUTPATIENT
Start: 2025-03-28 | End: 2025-03-28 | Stop reason: HOSPADM

## 2025-03-28 RX ADMIN — SODIUM CHLORIDE, POTASSIUM CHLORIDE, SODIUM LACTATE AND CALCIUM CHLORIDE: 600; 310; 30; 20 INJECTION, SOLUTION INTRAVENOUS at 11:48

## 2025-03-28 RX ADMIN — HYDROCODONE BITARTRATE AND ACETAMINOPHEN 1 TABLET: 5; 325 TABLET ORAL at 15:35

## 2025-03-28 RX ADMIN — PROPOFOL 17 MG: 10 INJECTION, EMULSION INTRAVENOUS at 13:04

## 2025-03-28 RX ADMIN — WATER 2000 MG: 1 INJECTION INTRAMUSCULAR; INTRAVENOUS; SUBCUTANEOUS at 13:07

## 2025-03-28 RX ADMIN — FENTANYL CITRATE 50 MCG: 50 INJECTION INTRAMUSCULAR; INTRAVENOUS at 12:56

## 2025-03-28 RX ADMIN — FENTANYL CITRATE 50 MCG: 50 INJECTION INTRAMUSCULAR; INTRAVENOUS at 13:19

## 2025-03-28 RX ADMIN — LIDOCAINE HYDROCHLORIDE 3 ML: 20 INJECTION, SOLUTION INFILTRATION; PERINEURAL at 12:56

## 2025-03-28 RX ADMIN — ONDANSETRON 4 MG: 2 INJECTION INTRAMUSCULAR; INTRAVENOUS at 13:48

## 2025-03-28 RX ADMIN — DEXAMETHASONE SODIUM PHOSPHATE 4 MG: 4 INJECTION INTRA-ARTICULAR; INTRALESIONAL; INTRAMUSCULAR; INTRAVENOUS; SOFT TISSUE at 13:09

## 2025-03-28 ASSESSMENT — PAIN SCALES - GENERAL
PAINLEVEL_OUTOF10: 0
PAINLEVEL_OUTOF10: 4
PAINLEVEL_OUTOF10: 2
PAINLEVEL_OUTOF10: 0
PAINLEVEL_OUTOF10: 0

## 2025-03-29 VITALS
WEIGHT: 198.41 LBS | OXYGEN SATURATION: 97 % | HEART RATE: 72 BPM | SYSTOLIC BLOOD PRESSURE: 113 MMHG | RESPIRATION RATE: 16 BRPM | BODY MASS INDEX: 28.41 KG/M2 | DIASTOLIC BLOOD PRESSURE: 81 MMHG | TEMPERATURE: 96.8 F | HEIGHT: 70 IN

## 2025-04-04 LAB
ASR DISCLAIMER: NORMAL
CASE RPRT: NORMAL
CLINICAL INFO: NORMAL
PATH REPORT.FINAL DX SPEC: NORMAL
PATH REPORT.GROSS SPEC: NORMAL

## 2025-04-17 ENCOUNTER — OFFICE VISIT (OUTPATIENT)
Dept: PODIATRY CLINIC | Facility: CLINIC | Age: 85
End: 2025-04-17

## 2025-04-17 DIAGNOSIS — M77.42 METATARSALGIA OF BOTH FEET: ICD-10-CM

## 2025-04-17 DIAGNOSIS — B35.1 ONYCHOMYCOSIS: Primary | ICD-10-CM

## 2025-04-17 DIAGNOSIS — M20.11 VALGUS DEFORMITY OF BOTH GREAT TOES: ICD-10-CM

## 2025-04-17 DIAGNOSIS — M20.12 VALGUS DEFORMITY OF BOTH GREAT TOES: ICD-10-CM

## 2025-04-17 DIAGNOSIS — M77.41 METATARSALGIA OF BOTH FEET: ICD-10-CM

## 2025-04-17 DIAGNOSIS — M21.621 TAILOR'S BUNION OF BOTH FEET: ICD-10-CM

## 2025-04-17 DIAGNOSIS — L84 HELOMA MOLLE: ICD-10-CM

## 2025-04-17 DIAGNOSIS — L60.0 ONYCHOCRYPTOSIS: ICD-10-CM

## 2025-04-17 DIAGNOSIS — M21.622 TAILOR'S BUNION OF BOTH FEET: ICD-10-CM

## 2025-04-17 PROCEDURE — 99213 OFFICE O/P EST LOW 20 MIN: CPT | Performed by: STUDENT IN AN ORGANIZED HEALTH CARE EDUCATION/TRAINING PROGRAM

## 2025-04-17 NOTE — PROGRESS NOTES
[facility] Podiatry  Progress Note    Andrzej Morley is a 84 year old male.   Chief Complaint   Patient presents with    Toenail Care     Nail care and foot check- callus care- pcp lov 03/14/25          HPI:     Patient is a pleasant 84-year-old male who returns to clinic for evaluation of elongated nails and thickened calluses he has difficulty trimming his own.  He gets recurrent callus between his left fourth and fifth digits that is doing better since he has been using padding to offload.  He has been managing this fairly well with silicone toe pad.  He denies any open sores to his feet or other concerns.  Past medical history, medications, and allergies reviewed.      Allergies: Patient has no known allergies.   Current Outpatient Medications   Medication Sig Dispense Refill    famotidine 20 MG Oral Tab Take 1 tablet (20 mg total) by mouth 2 (two) times daily.      amLODIPine 5 MG Oral Tab Take 1 tablet (5 mg total) by mouth at bedtime.      Apoaequorin (PREVAGEN OR) Take by mouth daily.      ATORVASTATIN 80 MG Oral Tab TAKE 1 TABLET BY MOUTH EVERY DAY IN THE EVENING 90 tablet 0    naproxen 500 MG Oral Tab Take 1 tablet (500 mg total) by mouth as needed.      psyllium 28 % Oral Powd Pack Take 1 packet by mouth daily.      Cholecalciferol (VITAMIN D) 1000 units Oral Tab Take 100 Units by mouth daily.      polypropylene glycol- 0.4-0.3 % Ophthalmic Solution Place 1 drop into both eyes as needed.      silodosin 8 MG Oral Cap Take 1 capsule (8 mg total) by mouth every evening.      Simethicone 180 MG Oral Cap Take by mouth as needed.        Past Medical History:    Actinic keratosis - OSWALD Lee    Cancer (HCC)    prostate    Colon cancer screening [6/2/13] recheck 10 years [At that time, is risk > benefit ?] -  JUANJOSE Zee    Diverticulosis of large intestine    prior doctor told patient, walls thin, possibility of rupture; careful having a colonoscopy    Esophageal reflux    H/O [ ~ ' 68 ] Rt  sialadenitis [stone extraction]    Hearing impairment    High blood pressure    High cholesterol    Hypercholesterolemia    Osteoarthritis    Osteoarthritis of hip    Other and unspecified personal history of malignant neoplasm    Prostate cancer.  [place] 3,3.  Pretreatment psa 4.4    Reflux    S/P [ ~ ' 75 ] vasectomy     Visual impairment      Past Surgical History:   Procedure Laterality Date    Colonoscopy N/A 1/14/2021    Procedure: COLONOSCOPY;  Surgeon: Michael Funes MD;  Location:  ENDOSCOPY    Exc skin benig 1.1-2cm trunk,arm,leg N/A 10/16/2014    Procedure: EXCISION OF SEBACEOUS CYST;  Surgeon: Issac Gonzalez MD;  Location: [facility]    Hip replacement surgery      right hip -2004 - dr merlos    Other surgical history      excision of michelle scys on back- Dr. [id number]    Patient documented not to have experienced any of the following events N/A 10/16/2014    Procedure: EXCISION OF SEBACEOUS CYST;  Surgeon: Issac Gonzalez MD;  Location: [facility]    Patient withough preoperative order for iv antibiotic surgical site infection prophylaxis. N/A 10/16/2014    Procedure: EXCISION OF SEBACEOUS CYST;  Surgeon: Issac Gonzalez MD;  Location: [facility]    Special service or report  Aug 2006    Brachytherapy of the prostate    Tonsillectomy      Total hip replacement Bilateral     Total knee replacement Left       Family History   Problem Relation Age of Onset    Pulmonary Disease Mother         copd    Heart Disease Sister         atrial fibrillation    Heart Attack Father       Social History     Socioeconomic History    Marital status:    Tobacco Use    Smoking status: Former    Smokeless tobacco: Never    Tobacco comments:     quit cigarettes 2006   Vaping Use    Vaping status: Never Used   Substance and Sexual Activity    Alcohol use: Yes     Alcohol/week: 7.0 standard drinks of alcohol     Types: 7 Standard drinks or equivalent per week     Comment: SOCIAL.    Drug use: No            REVIEW OF SYSTEMS:     No n/v/f/c.      EXAM:   There were no vitals taken for this visit.  GENERAL: well developed, well nourished, in no apparent distress  EXTREMITIES:   1. Integument: Normal skin temperature and turgor.  Nails x10 are elongated, thickened, dystrophic, subungual debris.  Hallux nails are incurvated bilaterally.   Very minimal hyperkeratotic lesion noted between fourth and fifth digits of left foot consistent with heloma molle.   No open lesions or acute signs of infection are noted.  2. Vascular: Dorsalis pedis two out of four bilateral and posterior tibial pulses two out of    four bilateral, capillary refill normal.   3. Musculoskeletal: All muscle groups are graded 5 out of 5 in the foot and ankle.  Decreased plantar fat pad noted bilaterally.  HAV deformity noted bilaterally with tailor bunion deformity noted bilaterally.  Prominent metatarsal heads noted bilaterally.   4. Neurological: Normal sharp dull sensation; reflexes normal.          ASSESSMENT AND PLAN:   Diagnoses and all orders for this visit:    Onychomycosis    Onychocryptosis    Metatarsalgia of both feet    Heloma molle    Tailor's bunion of both feet    Valgus deformity of both great toes                Plan:   -Patient examined, chart history reviewed.  -Discussed importance of proper pedal hygiene and regular foot checks.  -Sharply debrided nails x10 with a sterile nail nipper achieving a 20% reduction in thickness and length, without incident. Nails further smoothed with dremel. Pinpoint bleeding noted that was dressed with bacitracin.  -Pared HPKs x 1 with #15 blade healthy tissue without incident.  -Patient can use gauze or toe spacer between fourth and fifth digits of left foot to prevent recurrence of heloma molle.  -Patient can continue using Aquaphor to moisturize sites on his own.   -If symptoms worsen or fail to improve can consider surgical intervention.  -Educated patient on acute signs of infection advised  patient to seek immediate medical attention if symptoms arise.    The patient indicates understanding of these issues and agrees to the plan.    RTC 2 months.    Jorgito Garcia DPM

## 2025-06-18 ENCOUNTER — OFFICE VISIT (OUTPATIENT)
Dept: PODIATRY CLINIC | Facility: CLINIC | Age: 85
End: 2025-06-18
Payer: MEDICARE

## 2025-06-18 DIAGNOSIS — M77.42 METATARSALGIA OF BOTH FEET: ICD-10-CM

## 2025-06-18 DIAGNOSIS — M20.12 VALGUS DEFORMITY OF BOTH GREAT TOES: ICD-10-CM

## 2025-06-18 DIAGNOSIS — M20.11 VALGUS DEFORMITY OF BOTH GREAT TOES: ICD-10-CM

## 2025-06-18 DIAGNOSIS — M77.41 METATARSALGIA OF BOTH FEET: ICD-10-CM

## 2025-06-18 DIAGNOSIS — B35.1 ONYCHOMYCOSIS: Primary | ICD-10-CM

## 2025-06-18 DIAGNOSIS — M21.621 TAILOR'S BUNION OF BOTH FEET: ICD-10-CM

## 2025-06-18 DIAGNOSIS — M21.622 TAILOR'S BUNION OF BOTH FEET: ICD-10-CM

## 2025-06-18 DIAGNOSIS — L84 HELOMA MOLLE: ICD-10-CM

## 2025-06-18 DIAGNOSIS — L60.0 ONYCHOCRYPTOSIS: ICD-10-CM

## 2025-06-18 PROCEDURE — 99213 OFFICE O/P EST LOW 20 MIN: CPT | Performed by: STUDENT IN AN ORGANIZED HEALTH CARE EDUCATION/TRAINING PROGRAM

## 2025-06-18 NOTE — PROGRESS NOTES
[facility] Podiatry  Progress Note    Andrzej Morley is a 84 year old male.   Chief Complaint   Patient presents with    Toenail Care     Nail care and callus check         HPI:     Patient is a pleasant 84-year-old male who returns to clinic for evaluation of elongated nails and thickened calluses he has difficulty trimming his own.  He gets recurrent callus between his left fourth and fifth digits that is doing better since he has been using padding to offload.  He has been managing this fairly well with silicone toe pad.  He denies any open sores to his feet or other concerns.  Past medical history, medications, and allergies reviewed.      Allergies: Patient has no known allergies.   Current Outpatient Medications   Medication Sig Dispense Refill    famotidine 20 MG Oral Tab Take 1 tablet (20 mg total) by mouth 2 (two) times daily.      amLODIPine 5 MG Oral Tab Take 1 tablet (5 mg total) by mouth at bedtime.      Apoaequorin (PREVAGEN OR) Take by mouth daily.      ATORVASTATIN 80 MG Oral Tab TAKE 1 TABLET BY MOUTH EVERY DAY IN THE EVENING 90 tablet 0    naproxen 500 MG Oral Tab Take 1 tablet (500 mg total) by mouth as needed.      psyllium 28 % Oral Powd Pack Take 1 packet by mouth daily.      Cholecalciferol (VITAMIN D) 1000 units Oral Tab Take 100 Units by mouth daily.      polypropylene glycol- 0.4-0.3 % Ophthalmic Solution Place 1 drop into both eyes as needed.      silodosin 8 MG Oral Cap Take 1 capsule (8 mg total) by mouth every evening.      Simethicone 180 MG Oral Cap Take by mouth as needed.        Past Medical History:    Actinic keratosis - OSWALD Lee    Cancer (HCC)    prostate    Colon cancer screening [6/2/13] recheck 10 years [At that time, is risk > benefit ?] -  JUANJOSE Zee    Diverticulosis of large intestine    prior doctor told patient, walls thin, possibility of rupture; careful having a colonoscopy    Esophageal reflux    H/O [ ~ ' 66 ] Rt sialadenitis [stone extraction]     Hearing impairment    High blood pressure    High cholesterol    Hypercholesterolemia    Osteoarthritis    Osteoarthritis of hip    Other and unspecified personal history of malignant neoplasm    Prostate cancer.  [place] 3,3.  Pretreatment psa 4.4    Reflux    S/P [ ~ ' 75 ] vasectomy     Visual impairment      Past Surgical History:   Procedure Laterality Date    Colonoscopy N/A 1/14/2021    Procedure: COLONOSCOPY;  Surgeon: Michael Funes MD;  Location:  ENDOSCOPY    Exc skin benig 1.1-2cm trunk,arm,leg N/A 10/16/2014    Procedure: EXCISION OF SEBACEOUS CYST;  Surgeon: Issac Gonzalez MD;  Location: [facility]    Hip replacement surgery      right hip -2004 - dr merlos    Other surgical history      excision of michelle scys on back- Dr. [id number]    Patient documented not to have experienced any of the following events N/A 10/16/2014    Procedure: EXCISION OF SEBACEOUS CYST;  Surgeon: Issac Gonzalez MD;  Location: [facility]    Patient withough preoperative order for iv antibiotic surgical site infection prophylaxis. N/A 10/16/2014    Procedure: EXCISION OF SEBACEOUS CYST;  Surgeon: Issac Gonzalez MD;  Location: [facility]    Special service or report  Aug 2006    Brachytherapy of the prostate    Tonsillectomy      Total hip replacement Bilateral     Total knee replacement Left       Family History   Problem Relation Age of Onset    Pulmonary Disease Mother         copd    Heart Disease Sister         atrial fibrillation    Heart Attack Father       Social History     Socioeconomic History    Marital status:    Tobacco Use    Smoking status: Former    Smokeless tobacco: Never    Tobacco comments:     quit cigarettes 2006   Vaping Use    Vaping status: Never Used   Substance and Sexual Activity    Alcohol use: Yes     Alcohol/week: 7.0 standard drinks of alcohol     Types: 7 Standard drinks or equivalent per week     Comment: SOCIAL.    Drug use: No           REVIEW OF SYSTEMS:     No  n/v/f/c.      EXAM:   There were no vitals taken for this visit.  GENERAL: well developed, well nourished, in no apparent distress  EXTREMITIES:   1. Integument: Normal skin temperature and turgor.  Nails x10 are elongated, thickened, dystrophic, subungual debris.  Hallux nails are incurvated bilaterally.   Very minimal hyperkeratotic lesion noted between fourth and fifth digits of left foot consistent with heloma molle.   No open lesions or acute signs of infection are noted.  2. Vascular: Dorsalis pedis two out of four bilateral and posterior tibial pulses two out of    four bilateral, capillary refill normal.   3. Musculoskeletal: All muscle groups are graded 5 out of 5 in the foot and ankle.  Decreased plantar fat pad noted bilaterally.  HAV deformity noted bilaterally with tailor bunion deformity noted bilaterally.  Prominent metatarsal heads noted bilaterally.   4. Neurological: Normal sharp dull sensation; reflexes normal.          ASSESSMENT AND PLAN:   Diagnoses and all orders for this visit:    Onychomycosis    Onychocryptosis    Metatarsalgia of both feet    Heloma molle    Tailor's bunion of both feet    Valgus deformity of both great toes                Plan:   -Patient examined, chart history reviewed.  -Discussed importance of proper pedal hygiene and regular foot checks.  -Sharply debrided nails x10 with a sterile nail nipper achieving a 20% reduction in thickness and length, without incident. Nails further smoothed with dremel.  -Pared HPKs x 1 with #15 blade healthy tissue without incident.  -Patient can use gauze or toe spacer between fourth and fifth digits of left foot to prevent recurrence of heloma molle.  -Patient can continue using Aquaphor to moisturize sites on his own.   -If symptoms worsen or fail to improve can consider surgical intervention.  -Educated patient on acute signs of infection advised patient to seek immediate medical attention if symptoms arise.    The patient indicates  understanding of these issues and agrees to the plan.    RTC 2 months.    Jorgito Garcia, KETTY

## 2025-08-20 ENCOUNTER — OFFICE VISIT (OUTPATIENT)
Dept: PODIATRY CLINIC | Facility: CLINIC | Age: 85
End: 2025-08-20

## 2025-08-20 DIAGNOSIS — M79.671 PAIN IN BOTH FEET: ICD-10-CM

## 2025-08-20 DIAGNOSIS — M77.41 METATARSALGIA OF BOTH FEET: ICD-10-CM

## 2025-08-20 DIAGNOSIS — M21.621 TAILOR'S BUNION OF BOTH FEET: ICD-10-CM

## 2025-08-20 DIAGNOSIS — B35.1 ONYCHOMYCOSIS: Primary | ICD-10-CM

## 2025-08-20 DIAGNOSIS — M21.622 TAILOR'S BUNION OF BOTH FEET: ICD-10-CM

## 2025-08-20 DIAGNOSIS — M20.11 VALGUS DEFORMITY OF BOTH GREAT TOES: ICD-10-CM

## 2025-08-20 DIAGNOSIS — L60.0 ONYCHOCRYPTOSIS: ICD-10-CM

## 2025-08-20 DIAGNOSIS — M77.42 METATARSALGIA OF BOTH FEET: ICD-10-CM

## 2025-08-20 DIAGNOSIS — M54.50 LEFT-SIDED LOW BACK PAIN WITHOUT SCIATICA, UNSPECIFIED CHRONICITY: ICD-10-CM

## 2025-08-20 DIAGNOSIS — M79.672 PAIN IN BOTH FEET: ICD-10-CM

## 2025-08-20 DIAGNOSIS — M20.12 VALGUS DEFORMITY OF BOTH GREAT TOES: ICD-10-CM

## 2025-08-20 DIAGNOSIS — L84 HELOMA MOLLE: ICD-10-CM

## 2025-08-20 DIAGNOSIS — L84 PRE-ULCERATIVE CALLUSES: ICD-10-CM

## 2025-08-20 PROCEDURE — 99213 OFFICE O/P EST LOW 20 MIN: CPT | Performed by: STUDENT IN AN ORGANIZED HEALTH CARE EDUCATION/TRAINING PROGRAM

## (undated) DEVICE — EVACUATOR URO UROVAC BLDR ADPR IRR LTX DISP

## (undated) DEVICE — GLOVE SURG 6.5 PROTEXIS LF CRM PF BEAD CUFF STRL PLISPRN

## (undated) DEVICE — GUIDEWIRE SNSR STRGT .035IN 150CM URO FLX TP RADOPQ NTNL SS

## (undated) DEVICE — SOLUTION IRRIG 1000ML ST H2O AQUALITE PLAS

## (undated) DEVICE — CATHETER BARDEX LBRCTH 18FR 30CC FOLEY 2 WAY 2 OPPOSE DRN

## (undated) DEVICE — MARKER SKIN L6 IN RULER LABEL NONSMEAR REG TIP BLK

## (undated) DEVICE — Device

## (undated) DEVICE — ELECTRODE ESURG 12 D 16 D LOOP STD OD24 FR PLASMALOOP HI

## (undated) DEVICE — SYRINGE,TOOMEY,IRRIGATION,70CC,STERILE: Brand: MEDLINE

## (undated) DEVICE — CATHETER BALLOON 50MM 30FR OPTILUME PACLITAXEL

## (undated) DEVICE — HF-RESECTION ELECTRODE PLASMALOOP LOOP, MEDIUM, 24 FR., 12°/16°, ESG TURIS: Brand: OLYMPUS

## (undated) DEVICE — FILTERLINE NASAL ADULT O2/CO2

## (undated) DEVICE — TOWEL OR BLU 16X26IN 4PK

## (undated) DEVICE — 3M™ RED DOT™ MONITORING ELECTRODE WITH FOAM TAPE AND STICKY GEL, 50/BAG, 20/CASE, 72/PLT 2570: Brand: RED DOT™

## (undated) DEVICE — BAG URINE 4 LITER 600909

## (undated) DEVICE — CYSTO CDS-LF: Brand: MEDLINE INDUSTRIES, INC.

## (undated) DEVICE — SYRINGE CATH TIP GRADUATED NONPYROGENIC DEHP FREE PVC FREE

## (undated) DEVICE — SYRINGE 10 ML GRAD NONPYROGENIC DEHP FREE PVC FREE LOK MED

## (undated) DEVICE — SLEEVE COMPR M KNEE LEN SGL USE KENDALL SCD

## (undated) DEVICE — HIGH PRESSURE NEPHROSTOMY BALLOON CATHETER KIT: Brand: NEPHROMAX KIT

## (undated) DEVICE — SYRINGE 50 ML GRAD NONPYROGENIC DEHP FREE PVC FREE LOK MED

## (undated) DEVICE — SKN PREP SPNG STKS PVP PNT STR: Brand: MEDLINE INDUSTRIES, INC.

## (undated) DEVICE — DEVICE INFL ATRION 40CC 15 ATM

## (undated) DEVICE — ENDOSCOPY PACK - LOWER: Brand: MEDLINE INDUSTRIES, INC.

## (undated) DEVICE — SYRINGE MED 30ML STD CLR PLAS LL TIP N CTRL

## (undated) DEVICE — FORCEP BIOPSY RJ4 LG CAP W/ND

## (undated) DEVICE — SPONGE GZ 4XL4IN 100% COT 12 PLY TYP VII WVN

## (undated) DEVICE — ENDOSCOPY PACK UPPER: Brand: MEDLINE INDUSTRIES, INC.

## (undated) DEVICE — CONTAINER SPEC 4 OZ STD SCR TOP LID GRAD TRANS GENT-L-KARE

## (undated) DEVICE — GLOVE SURG 7.5 PROTEXIS LF CRM PF SMTH BEAD CUFF STRL

## (undated) DEVICE — GLOVE SURGICAL 7.5 SENSICARE P

## (undated) DEVICE — BAG DRNGE 2000ML URIN INF CTRL ANTI REFLX

## (undated) DEVICE — Device: Brand: DEFENDO AIR/WATER/SUCTION AND BIOPSY VALVE

## (undated) DEVICE — ELECTRODE ESURG 12D .35IN LOOP 24FR HFRQ RESCT WIRE STRL

## (undated) DEVICE — SOLUTION IRRIG 3000ML 0.9% NACL FLX CONT

## (undated) DEVICE — DEVICE SECUREMENT AD TRICOT ANCHR PD SWVL RET

## (undated) DEVICE — 1200CC GUARDIAN II: Brand: GUARDIAN

## (undated) NOTE — LETTER
2/8/2021          [facility] [id number]    Dear Analisa Melchor,      Here are the biopsy/pathology findings from your recent EGD (upper endoscopy) and colonoscopy:     The biopsy/pathology findings from your upper endoscopy showed